# Patient Record
Sex: FEMALE | Race: BLACK OR AFRICAN AMERICAN | Employment: FULL TIME | URBAN - METROPOLITAN AREA
[De-identification: names, ages, dates, MRNs, and addresses within clinical notes are randomized per-mention and may not be internally consistent; named-entity substitution may affect disease eponyms.]

---

## 2019-01-25 PROBLEM — I10 UNCONTROLLED HYPERTENSION: Status: ACTIVE | Noted: 2019-01-25

## 2019-01-25 PROBLEM — R06.00 DYSPNEA: Status: ACTIVE | Noted: 2019-01-25

## 2019-01-25 PROBLEM — I16.0 HYPERTENSIVE URGENCY: Status: ACTIVE | Noted: 2019-01-25

## 2020-01-24 ENCOUNTER — OFFICE VISIT (OUTPATIENT)
Dept: FAMILY MEDICINE CLINIC | Age: 60
End: 2020-01-24

## 2020-01-24 VITALS
BODY MASS INDEX: 45.82 KG/M2 | WEIGHT: 275 LBS | DIASTOLIC BLOOD PRESSURE: 124 MMHG | HEIGHT: 65 IN | TEMPERATURE: 97.4 F | SYSTOLIC BLOOD PRESSURE: 168 MMHG | RESPIRATION RATE: 16 BRPM | HEART RATE: 86 BPM

## 2020-01-24 DIAGNOSIS — R91.8 MULTIPLE LUNG NODULES ON CT: ICD-10-CM

## 2020-01-24 DIAGNOSIS — R06.09 DYSPNEA ON EXERTION: ICD-10-CM

## 2020-01-24 DIAGNOSIS — I10 UNCONTROLLED HYPERTENSION: Primary | ICD-10-CM

## 2020-01-24 DIAGNOSIS — E66.01 OBESITY, MORBID (HCC): ICD-10-CM

## 2020-01-24 DIAGNOSIS — I20.9 ANGINAL PAIN (HCC): ICD-10-CM

## 2020-01-24 DIAGNOSIS — I25.119 CORONARY ARTERY DISEASE INVOLVING NATIVE HEART WITH ANGINA PECTORIS, UNSPECIFIED VESSEL OR LESION TYPE (HCC): ICD-10-CM

## 2020-01-24 PROBLEM — I25.10 CAD (CORONARY ARTERY DISEASE): Status: ACTIVE | Noted: 2020-01-24

## 2020-01-24 PROBLEM — I16.0 HYPERTENSIVE URGENCY: Status: RESOLVED | Noted: 2019-01-25 | Resolved: 2020-01-24

## 2020-01-24 RX ORDER — METOPROLOL SUCCINATE 100 MG/1
100 TABLET, EXTENDED RELEASE ORAL DAILY
Qty: 30 TAB | Refills: 0 | Status: SHIPPED | OUTPATIENT
Start: 2020-01-24 | End: 2020-02-21 | Stop reason: SDUPTHER

## 2020-01-24 RX ORDER — HYDROCHLOROTHIAZIDE 25 MG/1
25 TABLET ORAL DAILY
Qty: 30 TAB | Refills: 0 | Status: SHIPPED | OUTPATIENT
Start: 2020-01-24 | End: 2020-02-21 | Stop reason: SDUPTHER

## 2020-01-24 NOTE — PROGRESS NOTES
[address] [place] [address]               [phone]      Patel Cruz is a 61 y.o. female and presents with Establish Care and Hypertension (been reading high at home- No Symptoms )       Assessment/Plan:    Diagnoses and all orders for this visit:    1. Uncontrolled hypertension  -     EXERCISE CARDIAC STRESS TEST; Future  -     metoprolol succinate (TOPROL-XL) 100 mg tablet; Take 1 Tab by mouth daily. -     hydroCHLOROthiazide (HYDRODIURIL) 25 mg tablet; Take 1 Tab by mouth daily.  -     CBC W/O DIFF; Future  -     METABOLIC PANEL, COMPREHENSIVE; Future  Here today to establish care and address acute and chronic medical conditions  During today's office visit she expressed that she would get feelings of chest pressure and shortness of breath with exertion that resolves with rest, will order a cardiac stress test  Blood pressure elevated at today's visit at 168/124, she has not had any of her own medications she has been using her 's  Medication refills given based on last known drugs and dosages  Follow-up routine labs to evaluate hepatic and renal function    2. Obesity, morbid ([address]  -     LIPID PANEL; Future  We will check lipid level today    3. Coronary artery disease involving native heart with angina pectoris, unspecified vessel or lesion type [facility])  Past medical history of an MI, will obtain a cardiac stress test and follow-up of her reported symptoms today    4. Dyspnea on exertion  -     EXERCISE CARDIAC STRESS TEST; Future  -     CBC W/O DIFF; Future  Cardiac stress test for her reports of shortness of breath on exertion we will obtain a CBC to check for anemia    5. Multiple lung nodules on CT  -     CT CHEST WO CONT; Future  In January 2019 she had a CTA of the chest done which showed multiple enlarged lymph nodes and nodules  We will order repeat CT scan and follow-up of this    6.  Anginal pain (HCC)  -     EXERCISE CARDIAC STRESS TEST; Future        Follow-up and Dispositions    · Return in about 4 days (around 1/28/2020) for b/p check, nurse visit, OK to schedule her for 0830 . Subjective:    Hypertension:   Patient reports taking medications as instructed. no, has been taking her husbands medications, they are lisinopril 20mg   Medication side effects noted. yes , had a cough with the lisinopril  Headache upon wakening. no   Home BP monitoring in range of 238/148, 771/224'R systolic. With exertion gets short of breath and experiences pressure, no radiation  Resolves with rest  PMH MI in 2013    Has been taking lipozene    ROS:As stated in HPI, otherwise all others negative. ROS    The problem list was updated as a part of today's visit. Patient Active Problem List   Diagnosis Code    Dyspnea R06.00    Uncontrolled hypertension I10    Obesity, morbid ([facility] E66.01    CAD (coronary artery disease) I25.10       The PSH, FH were reviewed. SH:  Social History     Tobacco Use    Smoking status: Former Smoker     Packs/day: 1.00     Years: 40.00     Pack years: 40.00    Smokeless tobacco: Never Used   Substance Use Topics    Alcohol use: No    Drug use: No       Medications/Allergies:  Current Outpatient Medications on File Prior to Visit   Medication Sig Dispense Refill    aspirin 81 mg chewable tablet Take 1 Tab by mouth daily. 30 Tab 0     No current facility-administered medications on file prior to visit. No Known Allergies    Objective:  Visit Vitals  BP (!) 168/124   Pulse 86   Temp 97.4 °F (36.3 °C) (Oral)   Resp 16   Ht 5' 5\" (1.651 m)   Wt 275 lb (124.7 kg)   BMI 45.76 kg/m²    Body mass index is 45.76 kg/m². Physical assessment  Physical Exam  Vitals signs and nursing note reviewed. Eyes:      Conjunctiva/sclera: Conjunctivae normal.      Pupils: Pupils are equal, round, and reactive to light. Neck:      Musculoskeletal: Normal range of motion. Vascular: No JVD.    Cardiovascular:      Rate and Rhythm: Normal rate and regular rhythm. Heart sounds: Normal heart sounds. No murmur. No friction rub. No gallop. Pulmonary:      Effort: Pulmonary effort is normal.      Breath sounds: Normal breath sounds. Musculoskeletal: Normal range of motion. Skin:     General: Skin is warm and dry. Neurological:      Mental Status: She is alert and oriented to person, place, and time.    Psychiatric:         Mood and Affect: Affect normal.         Cognition and Memory: Memory normal.         Judgment: Judgment normal.           Labwork and Ancillary Studies:    CBC w/Diff  Lab Results   Component Value Date/Time    WBC 5.0 01/24/2020 02:35 PM    HGB 13.8 01/24/2020 02:35 PM    PLATELET 383 02/66/1530 02:35 PM         Basic Metabolic Profile  Lab Results   Component Value Date/Time    Sodium 144 01/24/2020 02:35 PM    Potassium 4.2 01/24/2020 02:35 PM    Chloride 99 01/24/2020 02:35 PM    CO2 33 (H) 01/24/2020 02:35 PM    Anion gap 12.0 01/24/2020 02:35 PM    Glucose 89 01/24/2020 02:35 PM    BUN 11 01/24/2020 02:35 PM    Creatinine 0.7 01/24/2020 02:35 PM    GFR est AA >60.0 01/27/2019 05:04 AM    GFR est non-AA >60 01/27/2019 05:04 AM    Calcium 9.2 01/24/2020 02:35 PM        Cholesterol  Lab Results   Component Value Date/Time    Cholesterol, total 222 (H) 01/24/2020 02:35 PM    HDL Cholesterol 46 01/24/2020 02:35 PM    LDL, calculated 161 (H) 01/24/2020 02:35 PM    Triglyceride 73 01/24/2020 02:35 PM    CHOL/HDL Ratio 4.8 (H) 01/26/2019 03:51 AM       Health Maintenance:   Health Maintenance   Topic Date Due    Hepatitis C Screening  1960    DTaP/Tdap/Td series (1 - Tdap) 04/23/1971    PAP AKA CERVICAL CYTOLOGY  04/23/1981    Shingrix Vaccine Age 50> (1 of 2) 04/23/2010    BREAST CANCER SCRN MAMMOGRAM  04/23/2010    FOBT Q 1 YEAR AGE 50-75  04/23/2010    Influenza Age 5 to Adult  08/01/2019    Pneumococcal 0-64 years  Aged Out       I have discussed the diagnosis with the patient and the intended plan as seen in the above orders. The patient has received an After-Visit Summary and questions were answered concerning future plans. An After Visit Summary was printed and given to the patient. All diagnosis have been discussed with the patient and all of the patient's questions have been answered. Follow-up and Dispositions    · Return in about 4 days (around 1/28/2020) for b/p check, nurse visit, OK to schedule her for 0830 . Joana Encarnacion, AGNP-BC  [address] [id number] [facility] [address]  [id number]

## 2020-01-24 NOTE — LETTER
NOTIFICATION RETURN TO WORK / SCHOOL 
 
1/24/2020 2:26 PM 
 
Ms. Quoc Vogt 
[address] [place] [address] To Whom It May Concern: 
 
Alpha Coupe is currently under the care of Radha Awad. She will return to work/school on: 1/28/20 If there are questions or concerns please have the patient contact our office. Sincerely, Uvaldo Mayfield NP

## 2020-01-24 NOTE — PROGRESS NOTES
Chief Complaint   Patient presents with   [address] Hypertension     been reading high at home- No Symptoms        1. Have you been to the ER, urgent care clinic since your last visit? Hospitalized since your last visit? NO    2. Have you seen or consulted any other health care providers outside of the [address] [place] [address] since your last visit? Include any pap smears or colon screening.  NO

## 2020-01-25 LAB
A-G RATIO,AGRAT: 1.4 RATIO (ref 1.1–2.6)
ALBUMIN SERPL-MCNC: 4.1 G/DL (ref 3.5–5)
ALP SERPL-CCNC: 52 U/L (ref 25–115)
ALT SERPL-CCNC: 15 U/L (ref 5–40)
ANION GAP SERPL CALC-SCNC: 12 MMOL/L
AST SERPL W P-5'-P-CCNC: 10 U/L (ref 10–37)
BILIRUB SERPL-MCNC: 0.4 MG/DL (ref 0.2–1.2)
BUN SERPL-MCNC: 11 MG/DL (ref 6–22)
CALCIUM SERPL-MCNC: 9.2 MG/DL (ref 8.4–10.5)
CHLORIDE SERPL-SCNC: 99 MMOL/L (ref 98–110)
CHOLEST SERPL-MCNC: 222 MG/DL (ref 110–200)
CO2 SERPL-SCNC: 33 MMOL/L (ref 20–32)
CREAT SERPL-MCNC: 0.7 MG/DL (ref 0.5–1.2)
ERYTHROCYTE [DISTWIDTH] IN BLOOD BY AUTOMATED COUNT: 14.6 % (ref 10–15.5)
GFRAA, 66117: >60
GFRNA, 66118: >60
GLOBULIN,GLOB: 3 G/DL (ref 2–4)
GLUCOSE SERPL-MCNC: 89 MG/DL (ref 70–99)
HCT VFR BLD AUTO: 47.4 % (ref 35.1–48)
HDLC SERPL-MCNC: 4.8 MG/DL (ref 0–5)
HDLC SERPL-MCNC: 46 MG/DL
HGB BLD-MCNC: 13.8 G/DL (ref 11.7–16)
LDL/HDL RATIO,LDHD: 3.5
LDLC SERPL CALC-MCNC: 161 MG/DL (ref 50–99)
MCH RBC QN AUTO: 28 PG (ref 26–34)
MCHC RBC AUTO-ENTMCNC: 29 G/DL (ref 31–36)
MCV RBC AUTO: 96 FL (ref 81–99)
NON-HDL CHOLESTEROL, 011976: 176 MG/DL
PLATELET # BLD AUTO: 284 K/UL (ref 140–440)
PMV BLD AUTO: 11.7 FL (ref 9–13)
POTASSIUM SERPL-SCNC: 4.2 MMOL/L (ref 3.5–5.5)
PROT SERPL-MCNC: 7.1 G/DL (ref 6.4–8.3)
RBC # BLD AUTO: 4.96 M/UL (ref 3.8–5.2)
SODIUM SERPL-SCNC: 144 MMOL/L (ref 133–145)
TRIGL SERPL-MCNC: 73 MG/DL (ref 40–149)
VLDLC SERPL CALC-MCNC: 15 MG/DL (ref 8–30)
WBC # BLD AUTO: 5 K/UL (ref 4–11)

## 2020-01-28 ENCOUNTER — CLINICAL SUPPORT (OUTPATIENT)
Dept: FAMILY MEDICINE CLINIC | Age: 60
End: 2020-01-28

## 2020-01-28 VITALS
HEIGHT: 65 IN | TEMPERATURE: 97.9 F | BODY MASS INDEX: 44.82 KG/M2 | HEART RATE: 68 BPM | SYSTOLIC BLOOD PRESSURE: 168 MMHG | DIASTOLIC BLOOD PRESSURE: 108 MMHG | RESPIRATION RATE: 16 BRPM | WEIGHT: 269 LBS

## 2020-01-28 DIAGNOSIS — I10 UNCONTROLLED HYPERTENSION: Primary | ICD-10-CM

## 2020-01-28 RX ORDER — AMLODIPINE BESYLATE 10 MG/1
10 TABLET ORAL DAILY
Qty: 30 TAB | Refills: 2 | Status: SHIPPED | OUTPATIENT
Start: 2020-01-28 | End: 2020-03-24 | Stop reason: SDUPTHER

## 2020-01-28 NOTE — PROGRESS NOTES
Chief Complaint   Patient presents with    Blood Pressure Check     Have taken medications this morning       Per NP She added Norvasc 10mg daily and she wants pt to come back in a week.  For recheck of BP

## 2020-01-28 NOTE — LETTER
NOTIFICATION RETURN TO WORK / SCHOOL 
 
1/28/2020 8:46 AM 
 
Ms. Patti Bence 
[address] [place] [address] To Whom It May Concern: 
 
Patti Bence is currently under the care of Radha Awad. She will return to work/school on: 1/29/20 If there are questions or concerns please have the patient contact our office. Sincerely, Rand Martínez NP

## 2020-02-04 NOTE — PROGRESS NOTES
Please let her know her total and LDL cholesterol are elevated, no medications are indicated at this time however, she needs to work on weight loss and eating a low-fat heart healthy diet along with increasing exercise

## 2020-02-11 NOTE — PROGRESS NOTES
Pt had a nurse visit for Blood pressure last week- And was informed of this information from JANICE saab's note.  Thank You

## 2020-02-13 ENCOUNTER — HOSPITAL ENCOUNTER (OUTPATIENT)
Dept: CT IMAGING | Age: 60
Discharge: HOME | End: 2020-02-13
Attending: NURSE PRACTITIONER
Payer: COMMERCIAL

## 2020-02-13 DIAGNOSIS — R91.8 MULTIPLE LUNG NODULES ON CT: ICD-10-CM

## 2020-02-13 PROCEDURE — 71250 CT THORAX DX C-: CPT

## 2020-02-17 ENCOUNTER — CLINICAL SUPPORT (OUTPATIENT)
Dept: FAMILY MEDICINE CLINIC | Age: 60
End: 2020-02-17

## 2020-02-17 VITALS — DIASTOLIC BLOOD PRESSURE: 93 MMHG | SYSTOLIC BLOOD PRESSURE: 141 MMHG

## 2020-02-17 DIAGNOSIS — I10 UNCONTROLLED HYPERTENSION: Primary | ICD-10-CM

## 2020-02-17 RX ORDER — OLMESARTAN MEDOXOMIL 20 MG/1
20 TABLET ORAL DAILY
Qty: 30 TAB | Refills: 2 | Status: SHIPPED | OUTPATIENT
Start: 2020-02-17 | End: 2020-03-24 | Stop reason: SDUPTHER

## 2020-02-17 NOTE — PROGRESS NOTES
Chief Complaint   Patient presents with    Blood Pressure Check     Patient reports taking BP medications after 9am. Patient took all medications this morning. Patient did not have coffee. Patient spoke with provider regarding new instructions.

## 2020-02-21 DIAGNOSIS — I10 UNCONTROLLED HYPERTENSION: ICD-10-CM

## 2020-02-21 NOTE — TELEPHONE ENCOUNTER
Requested Prescriptions     Pending Prescriptions Disp Refills    metoprolol succinate (TOPROL-XL) 100 mg tablet 30 Tab 0     Sig: Take 1 Tab by mouth daily.  hydroCHLOROthiazide (HYDRODIURIL) 25 mg tablet 30 Tab 0     Sig: Take 1 Tab by mouth daily.

## 2020-02-23 RX ORDER — HYDROCHLOROTHIAZIDE 25 MG/1
25 TABLET ORAL DAILY
Qty: 30 TAB | Refills: 0 | Status: SHIPPED | OUTPATIENT
Start: 2020-02-23 | End: 2020-03-24 | Stop reason: SDUPTHER

## 2020-02-23 RX ORDER — METOPROLOL SUCCINATE 100 MG/1
100 TABLET, EXTENDED RELEASE ORAL DAILY
Qty: 30 TAB | Refills: 0 | Status: SHIPPED | OUTPATIENT
Start: 2020-02-23 | End: 2020-03-24 | Stop reason: SDUPTHER

## 2020-02-23 NOTE — PATIENT INSTRUCTIONS
Start olmesartan 20 mg 1 tablet by mouth once a day, follow-up on previously scheduled visit for March 16

## 2020-03-25 DIAGNOSIS — I10 UNCONTROLLED HYPERTENSION: ICD-10-CM

## 2020-03-25 RX ORDER — AMLODIPINE BESYLATE 10 MG/1
10 TABLET ORAL DAILY
Qty: 90 TAB | Refills: 1 | Status: SHIPPED | OUTPATIENT
Start: 2020-03-25 | End: 2020-04-15

## 2020-03-25 RX ORDER — OLMESARTAN MEDOXOMIL 20 MG/1
20 TABLET ORAL DAILY
Qty: 90 TAB | Refills: 1 | Status: SHIPPED | OUTPATIENT
Start: 2020-03-25 | End: 2020-04-15

## 2020-03-25 RX ORDER — METOPROLOL SUCCINATE 100 MG/1
100 TABLET, EXTENDED RELEASE ORAL DAILY
Qty: 90 TAB | Refills: 1 | Status: SHIPPED | OUTPATIENT
Start: 2020-03-25 | End: 2020-04-15

## 2020-03-25 RX ORDER — HYDROCHLOROTHIAZIDE 25 MG/1
25 TABLET ORAL DAILY
Qty: 90 TAB | Refills: 1 | Status: SHIPPED | OUTPATIENT
Start: 2020-03-25 | End: 2020-04-15

## 2020-03-25 NOTE — TELEPHONE ENCOUNTER
Patient called stating that the pharmacy called her and told her that they don't have any over her medications that Janey Prather sent over to the pharmacy today 3/25/2020.  Patient requested a call back

## 2020-04-03 ENCOUNTER — TELEPHONE (OUTPATIENT)
Dept: FAMILY MEDICINE CLINIC | Age: 60
End: 2020-04-03

## 2020-04-03 NOTE — TELEPHONE ENCOUNTER
Patient thinks she has the COVID VIrus, experiencing cough, fever comes and goes and weakness. She does not want to go get tested. Ms. Shepard Eduardo would like to take Tylenol, Penicillin and Cough Syrup. Wants to know if this is know sufficient.    Call her back at [phone]

## 2020-04-03 NOTE — TELEPHONE ENCOUNTER
Returned Mrs. 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

## 2020-04-03 NOTE — TELEPHONE ENCOUNTER
Called pt back- she states she has not taken her Temp but states she has chills and clenches up. She has not traveled or been around anyone that has, that she knows of! 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 I told her I would discuss further with JANICE Ziegler and get back to her.  Thanks

## 2020-04-04 PROBLEM — J96.02 ACUTE RESPIRATORY FAILURE WITH HYPERCAPNIA (HCC): Status: ACTIVE | Noted: 2020-04-04

## 2020-04-04 PROBLEM — J18.9 RLL PNEUMONIA: Status: ACTIVE | Noted: 2020-04-04

## 2020-04-04 PROBLEM — J96.90 RESPIRATORY FAILURE (HCC): Status: ACTIVE | Noted: 2020-04-04

## 2020-04-04 PROBLEM — Z20.822 SUSPECTED COVID-19 VIRUS INFECTION: Status: ACTIVE | Noted: 2020-04-04

## 2020-04-04 PROBLEM — I21.4 NSTEMI (NON-ST ELEVATED MYOCARDIAL INFARCTION) (HCC): Status: ACTIVE | Noted: 2020-04-04

## 2020-04-04 PROBLEM — J18.9 PNEUMONIA: Status: ACTIVE | Noted: 2020-04-04

## 2020-04-15 PROBLEM — J18.9 PNEUMONIA: Status: RESOLVED | Noted: 2020-04-04 | Resolved: 2020-04-15

## 2020-04-15 PROBLEM — I21.4 NSTEMI (NON-ST ELEVATED MYOCARDIAL INFARCTION) (HCC): Status: ACTIVE | Noted: 2020-04-15

## 2020-04-15 PROBLEM — I46.9 PEA (PULSELESS ELECTRICAL ACTIVITY) (HCC): Status: ACTIVE | Noted: 2020-04-15

## 2020-04-15 PROBLEM — J96.02 ACUTE RESPIRATORY FAILURE WITH HYPOXIA AND HYPERCAPNIA (HCC): Status: ACTIVE | Noted: 2020-04-15

## 2020-04-15 PROBLEM — Z20.822 SUSPECTED COVID-19 VIRUS INFECTION: Status: RESOLVED | Noted: 2020-04-04 | Resolved: 2020-04-15

## 2020-04-15 PROBLEM — I46.9 PEA (PULSELESS ELECTRICAL ACTIVITY) (HCC): Status: RESOLVED | Noted: 2020-04-15 | Resolved: 2020-04-15

## 2020-04-15 PROBLEM — N17.9 AKI (ACUTE KIDNEY INJURY) (HCC): Status: ACTIVE | Noted: 2020-04-15

## 2020-04-15 PROBLEM — I50.30 DIASTOLIC CHF (HCC): Status: ACTIVE | Noted: 2020-04-15

## 2020-04-15 PROBLEM — J96.01 ACUTE RESPIRATORY FAILURE WITH HYPOXIA AND HYPERCAPNIA (HCC): Status: ACTIVE | Noted: 2020-04-15

## 2020-04-21 ENCOUNTER — VIRTUAL VISIT (OUTPATIENT)
Dept: FAMILY MEDICINE CLINIC | Age: 60
End: 2020-04-21

## 2020-04-21 NOTE — PROGRESS NOTES
A user error has taken place: encounter opened in error, closed for administrative reasons  Patient never answered her phone for visit.

## 2020-04-21 NOTE — PROGRESS NOTES
A user error has taken place: encounter opened in error, closed for administrative reasons. I left several messages on her cell phone regarding her Virtual appointment today but did not returned any of my calls.

## 2020-04-29 ENCOUNTER — VIRTUAL VISIT (OUTPATIENT)
Dept: FAMILY MEDICINE CLINIC | Age: 60
End: 2020-04-29

## 2020-04-29 DIAGNOSIS — I21.4 NSTEMI (NON-ST ELEVATED MYOCARDIAL INFARCTION) (HCC): ICD-10-CM

## 2020-04-29 DIAGNOSIS — J96.21 ACUTE ON CHRONIC RESPIRATORY FAILURE WITH HYPOXIA (HCC): Primary | ICD-10-CM

## 2020-04-29 DIAGNOSIS — I10 UNCONTROLLED HYPERTENSION: ICD-10-CM

## 2020-04-29 RX ORDER — HYDROCHLOROTHIAZIDE 25 MG/1
25 TABLET ORAL DAILY
COMMUNITY
End: 2020-04-29

## 2020-04-29 RX ORDER — CLOPIDOGREL BISULFATE 75 MG/1
75 TABLET ORAL DAILY
Qty: 30 TAB | Refills: 2 | Status: SHIPPED | OUTPATIENT
Start: 2020-04-29 | End: 2020-08-05 | Stop reason: SDUPTHER

## 2020-04-29 RX ORDER — ATORVASTATIN CALCIUM 80 MG/1
80 TABLET, FILM COATED ORAL
Qty: 30 TAB | Refills: 2 | Status: SHIPPED | OUTPATIENT
Start: 2020-04-29 | End: 2020-08-24 | Stop reason: SDUPTHER

## 2020-04-29 RX ORDER — METOPROLOL TARTRATE 50 MG/1
50 TABLET ORAL 2 TIMES DAILY
Qty: 60 TAB | Refills: 2 | Status: SHIPPED | OUTPATIENT
Start: 2020-04-29 | End: 2020-08-12 | Stop reason: SDUPTHER

## 2020-04-29 RX ORDER — OLMESARTAN MEDOXOMIL 20 MG/1
20 TABLET ORAL DAILY
COMMUNITY
End: 2020-04-29

## 2020-04-29 NOTE — PROGRESS NOTES
1. Have you been to the ER, urgent care clinic since your last visit? Hospitalized since your last visit? Yes, went to [address] [place] [address] on 4/4/2020 for Pneumonia. 2. Have you seen or consulted any other health care providers outside of the [address] [place] [address] since your last visit? Include any pap smears or colon screening. No    Chief Complaint   Patient presents with   [facility] Follow Up     went to [address] [place] [address] on 4/4/2020 for Pneumonia adn SOB. She didnt follow her discharged summary instructions regarding her current & new medications. No one ecpalined her d/c summary. Patient did not stop taking her olmesartan, HCTZ, metoprolol xl 100mg and norvasc and did not start taking the Plavix, metoprolol succ 50mg and lipitor 80mg since no once explained her d/c summary to her when she left the hospital on 4/15/2020.

## 2020-04-29 NOTE — PROGRESS NOTES
[address] [place] [address]               [phone]      Rayna Martin is a 61 y.o. female who was seen by synchronous (real-time) audio-video technology on 4/29/2020. Consent: Rayna Martin, who was seen by synchronous (real-time) audio-video technology, and/or her healthcare decision maker, is aware that this patient-initiated, Telehealth encounter on 4/29/2020 is a billable service, with coverage as determined by her insurance carrier. She is aware that she may receive a bill and has provided verbal consent to proceed: Yes. Assessment & Plan:     Diagnoses and all orders for this visit:    1. Acute on chronic respiratory failure with hypoxia ([address]  Recent 11-day hospitalization which included intubation  Now back at home, on this video visit she appeared comfortable and in no respiratory distress    2. NSTEMI (non-ST elevated myocardial infarction) ([facility])  -     atorvastatin (LIPITOR) 80 mg tablet; Take 1 Tab by mouth nightly. Indications: excessive fat in the blood, treatment to prevent a heart attack  -     clopidogreL (PLAVIX) 75 mg tab; Take 1 Tab by mouth daily. Indications: treatment to prevent a heart attack  -     metoprolol tartrate (LOPRESSOR) 50 mg tablet; Take 1 Tab by mouth two (2) times a day. Indications: myocardial reinfarction prevention  During her hospitalization she suffered a PEA arrest which resulted in a NSTEMI  Many of her medications were changed, I reviewed all this with her as she had stated she did not know her medications had been changed and that no one explained it to her  Went through her medications one by 1 telling her the reason why she was taking them and why the changes were done  She verbalized understanding  I also reviewed with her each of her diagnoses from the hospital, explained to her why she had a heart attack and how it was related to her respiratory failure  Medication refills ordered    3.  Uncontrolled hypertension  -     metoprolol tartrate (LOPRESSOR) 50 mg tablet; Take 1 Tab by mouth two (2) times a day. Indications: myocardial reinfarction prevention  Medication refill provided    Greater than 50% of the 40 minutes was spent in counseling and coordination of care. This note was dictated using Dragon dictation system. Every effort was made to ensure accuracy, although occasional spelling errors and word substitutions are expected due to dictation system limitations. Thank you for your understanding and patience. Follow-up and Dispositions    · Return in about 2 months (around 6/29/2020) for hypertension, hyperthyroid, copd, office visit, 30 minutes. 712  Subjective:   Danny Jiang is a 61 y.o. female who was seen for   Hospital Follow Up (went to [facility] on 4/4/2020 for Pneumonia adn SOB. She didnt follow her discharged summary instructions regarding her current & new medications. No one ecpalined her d/c summary. )    Hospitalization summary; Admitted for acute hypoxic and hypercapnic respiratory failure where she was subsequently intubated, she was ruled out for COVID-19, cause of her respiratory failure was determined to be pneumonia which was treated and she was ultimately extubated and discharged home on oxygen at 2 L/min via nasal cannula. During this hospitalization she did have a PEA arrest which they felt was related to the hypoxia resulting in a type II NSTEMI. Her troponins peaked at 6.13, she had an echocardiogram done which showed an EF of 58% and was started on aspirin and Plavix which cardiology recommended she continue for 6 to 12 months. She was also started on a high intensity statin. She is to follow-up with us brewer in 4 to 6 weeks.     Test results:  Echocardiogram showed an EF of 58%  Chest x-ray showed a right basilar pneumonia with a recommended follow-up chest x-ray in 30 days    Date admitted: 4/4/2020    Date discharged: 4/15/2020    Date of face to face: 4/29/2020    Medication reconciliation performed: Yes    Medications added/changed/discontinued: Start: Lipitor 80 mg, Plavix 75 mg, Lopressor 50 mg, Brio daily. Stop: Hydrochlorothiazide 25 mg, Toprol  mg, amlodipine 10 mg, and olmesartan 20 mg    Review discharge instructions: Yes    Need for follow up on in hospital testing: No    Need for follow up with specialist: Yes  Name of specialist: Cardiology, see eval to arrange follow-up in 4 to 6 weeks, she was also discharged with home health services    Does patient have help at home: Yes  Name: Brother, daughter    Barriers to obtaining medications: No    Patient/family educated on cause of hospitalization: Yes    Patient/family able to repeat back cause of hospitalization, disease process, medication changes, and when to seek help: Yes    Patient Past Records were reviewed:  yes        Prior to Admission medications    Medication Sig Start Date End Date Taking? Authorizing Provider   atorvastatin (LIPITOR) 80 mg tablet Take 1 Tab by mouth nightly for 30 days. Indications: excessive fat in the blood, treatment to prevent a heart attack 4/15/20 5/15/20 Yes Evelyn Morris MD   clopidogreL (PLAVIX) 75 mg tab 1 Tab by PEG Tube route daily for 30 days. Indications: treatment to prevent a heart attack 4/16/20 5/16/20 Yes Evelyn Morris MD   metoprolol tartrate (LOPRESSOR) 50 mg tablet Take 1 Tab by mouth two (2) times a day for 30 days. Indications: myocardial reinfarction prevention 4/15/20 5/15/20 Yes Evelyn Morris MD   fluticasone furoate-vilanteroL (BREO ELLIPTA) 100-25 mcg/dose inhaler Take 1 Puff by inhalation daily for 30 days. Indications: bronchospasm prevention with COPD 4/15/20 5/15/20 Yes Evelyn Morris MD   aspirin 81 mg chewable tablet Take 1 Tab by mouth daily. 3/25/20  Yes Caterina Mcclure NP   hydroCHLOROthiazide (HYDRODIURIL) 25 mg tablet Take 25 mg by mouth daily.   4/29/20  Provider, Historical   olmesartan (BENICAR) 20 mg tablet Take 20 mg by mouth daily. 4/29/20  Provider, Historical     No Known Allergies    Patient Active Problem List   Diagnosis Code    Dyspnea R06.00    Uncontrolled hypertension I10    Obesity, morbid ([facility] E66.01    CAD (coronary artery disease) I25.10    Respiratory failure ([address] J96.90    Acute respiratory failure with hypoxia and hypercapnia (HCC) J96.01, J96.02    NSTEMI (non-ST elevated myocardial infarction) ([facility] I21.4    SHALA (acute kidney injury) ([facility] X86.9    Diastolic CHF ([facility] A64.39     Patient Active Problem List    Diagnosis Date Noted    Acute respiratory failure with hypoxia and hypercapnia ([address] 04/15/2020    NSTEMI (non-ST elevated myocardial infarction) ([facility] 04/15/2020    SHALA (acute kidney injury) ([facility] 85/31/7403    Diastolic CHF ([address] 23/29/4609    Respiratory failure ([address] 04/04/2020    Obesity, morbid ([facility] 01/24/2020    CAD (coronary artery disease) 01/24/2020    Dyspnea 01/25/2019    Uncontrolled hypertension 01/25/2019     Current Outpatient Medications   Medication Sig Dispense Refill    atorvastatin (LIPITOR) 80 mg tablet Take 1 Tab by mouth nightly. Indications: excessive fat in the blood, treatment to prevent a heart attack 30 Tab 2    clopidogreL (PLAVIX) 75 mg tab Take 1 Tab by mouth daily. Indications: treatment to prevent a heart attack 30 Tab 2    metoprolol tartrate (LOPRESSOR) 50 mg tablet Take 1 Tab by mouth two (2) times a day. Indications: myocardial reinfarction prevention 60 Tab 2    fluticasone furoate-vilanteroL (BREO ELLIPTA) 100-25 mcg/dose inhaler Take 1 Puff by inhalation daily for 30 days. Indications: bronchospasm prevention with COPD 1 Inhaler 2    aspirin 81 mg chewable tablet Take 1 Tab by mouth daily.  80 Tab 1     No Known Allergies  Past Medical History:   Diagnosis Date    CAD (coronary artery disease)     mi 2013     Past Surgical History:   Procedure Laterality Date    HX CORONARY STENT PLACEMENT       Family History   Problem Relation Age of Onset    Hypertension Mother     Stroke Mother     No Known Problems Father      Social History     Tobacco Use    Smoking status: Former Smoker     Packs/day: 1.00     Years: 40.00     Pack years: 40.00    Smokeless tobacco: Never Used   Substance Use Topics    Alcohol use: No       ROS  As stated in HPI, otherwise all others negative. Objective: There were no vitals taken for this visit. General: alert, cooperative, no distress, sitting upright, talking in complete sentences   Mental  status: normal mood, behavior, speech, dress, motor activity, and thought processes, able to follow commands   HENT: NCAT   Neck: no visualized mass   Resp: no respiratory distress   Neuro: no gross deficits   Skin: no discoloration or lesions of concern on visible areas   Psychiatric: normal affect, consistent with stated mood, no evidence of hallucinations     Additional exam findings: We discussed the expected course, resolution and complications of the diagnosis(es) in detail. Medication risks, benefits, costs, interactions, and alternatives were discussed as indicated. I advised her to contact the office if her condition worsens, changes or fails to improve as anticipated. She expressed understanding with the diagnosis(es) and plan. Elvie Lefort is a 61 y.o. female who was evaluated by a video visit encounter for concerns as above. Patient identification was verified prior to start of the visit. A caregiver was present when appropriate. Due to this being a TeleHealth encounter ([facility] health emergency), evaluation of the following organ systems was limited: Vitals/Constitutional/EENT/Resp/CV/GI//MS/Neuro/Skin/Heme-Lymph-Imm.   Pursuant to the emergency declaration under the [facility], [facility], [facility] waiver authority and the [employer] and [employer] Act, this Virtual  Visit was conducted, with patient's (and/or legal guardian's) consent, to reduce the patient's risk of exposure to COVID-19 and provide necessary medical care. Services were provided through a video synchronous discussion virtually to substitute for in-person clinic visit. Patient and provider were located at their individual homes. An After Visit Summary was printed and given to the patient. All diagnosis have been discussed with the patient and all of the patient's questions have been answered. Follow-up and Dispositions    · Return in about 2 months (around 6/29/2020) for hypertension, hyperthyroid, copd, office visit, 30 minutes. Jaime Monteiro [facility] [place] [address] Rd.   Lin Downs

## 2020-06-05 ENCOUNTER — CLINICAL SUPPORT (OUTPATIENT)
Dept: FAMILY MEDICINE CLINIC | Age: 60
End: 2020-06-05

## 2020-06-05 VITALS
RESPIRATION RATE: 16 BRPM | BODY MASS INDEX: 44.72 KG/M2 | OXYGEN SATURATION: 96 % | DIASTOLIC BLOOD PRESSURE: 118 MMHG | HEIGHT: 65 IN | WEIGHT: 268.4 LBS | TEMPERATURE: 98.4 F | SYSTOLIC BLOOD PRESSURE: 194 MMHG | HEART RATE: 65 BPM

## 2020-06-05 DIAGNOSIS — I10 UNCONTROLLED HYPERTENSION: Primary | ICD-10-CM

## 2020-06-05 RX ORDER — OLMESARTAN MEDOXOMIL 40 MG/1
40 TABLET ORAL DAILY
Qty: 90 TAB | Refills: 1 | Status: SHIPPED | OUTPATIENT
Start: 2020-06-05 | End: 2020-06-22 | Stop reason: SDUPTHER

## 2020-06-05 NOTE — PROGRESS NOTES
Spoke with mrs low, instructed her to restart her olmesaran at the prior does, she still has some, and let me know her b/p in a week. She has a home machine    All diagnosis have been discussed with the patient and all of the patient's questions have been answered.

## 2020-06-05 NOTE — PROGRESS NOTES
Chief Complaint   Patient presents with    Blood Pressure Check    Breathing Problem     has COPD     Per Krishan Augustin NP she will call patient to make some medication changes on her BP medication. Phoe [phone].

## 2020-06-08 NOTE — PROGRESS NOTES
Done, I called patient and she stated that Luciana Goodpasture, NP called her last Friday and told her to restart the Olmesartan 40mg once a day and come in next week for BP check.  Scheduled 6/16/2020 at 10:15am

## 2020-06-22 DIAGNOSIS — I10 UNCONTROLLED HYPERTENSION: ICD-10-CM

## 2020-06-22 RX ORDER — OLMESARTAN MEDOXOMIL 40 MG/1
40 TABLET ORAL DAILY
Qty: 90 TAB | Refills: 1 | Status: SHIPPED | OUTPATIENT
Start: 2020-06-22 | End: 2020-06-24 | Stop reason: CLARIF

## 2020-06-23 ENCOUNTER — TELEPHONE (OUTPATIENT)
Dept: FAMILY MEDICINE CLINIC | Age: 60
End: 2020-06-23

## 2020-06-23 DIAGNOSIS — I10 UNCONTROLLED HYPERTENSION: Primary | ICD-10-CM

## 2020-06-23 NOTE — TELEPHONE ENCOUNTER
Pharmacy called is is requesting a alternate to  Benicar as it  is so expensive for the patient.   Call Jose Britt back at [phone]

## 2020-06-24 RX ORDER — LOSARTAN POTASSIUM 100 MG/1
100 TABLET ORAL DAILY
Qty: 90 TAB | Refills: 0 | Status: SHIPPED | OUTPATIENT
Start: 2020-06-24 | End: 2020-09-21 | Stop reason: SDUPTHER

## 2020-06-24 NOTE — TELEPHONE ENCOUNTER
Called mrs low at home  184/102 at home yesterday  Stop olmesartan  Start Losartan 100mg once a day  Goal b/p less than 140/90  Call if she cannot reach her goal with current medications  She verbalized understanding  As she does not currently have insurance I suggested she check her medication website for prescription assistance, she verbalized understanding and stated she was able to do that    All diagnosis have been discussed with the patient and all of the patient's questions have been answered.

## 2020-06-24 NOTE — TELEPHONE ENCOUNTER
Pt called back to f/u  She says that the pharmacy told her that she could get  Losartan 90 day supply for $24

## 2020-07-07 ENCOUNTER — OFFICE VISIT (OUTPATIENT)
Dept: FAMILY MEDICINE CLINIC | Age: 60
End: 2020-07-07

## 2020-07-07 VITALS
DIASTOLIC BLOOD PRESSURE: 100 MMHG | TEMPERATURE: 97.4 F | HEART RATE: 61 BPM | WEIGHT: 264 LBS | OXYGEN SATURATION: 95 % | HEIGHT: 65 IN | BODY MASS INDEX: 43.99 KG/M2 | RESPIRATION RATE: 14 BRPM | SYSTOLIC BLOOD PRESSURE: 181 MMHG

## 2020-07-07 DIAGNOSIS — I25.2 HISTORY OF NON-ST ELEVATION MYOCARDIAL INFARCTION (NSTEMI): ICD-10-CM

## 2020-07-07 DIAGNOSIS — Z12.31 SCREENING MAMMOGRAM, ENCOUNTER FOR: ICD-10-CM

## 2020-07-07 DIAGNOSIS — Z13.820 SCREENING FOR OSTEOPOROSIS: ICD-10-CM

## 2020-07-07 DIAGNOSIS — I10 UNCONTROLLED HYPERTENSION: Primary | ICD-10-CM

## 2020-07-07 DIAGNOSIS — E78.2 MIXED HYPERLIPIDEMIA: ICD-10-CM

## 2020-07-07 PROBLEM — I21.4 NSTEMI (NON-ST ELEVATED MYOCARDIAL INFARCTION) (HCC): Status: RESOLVED | Noted: 2020-04-15 | Resolved: 2020-07-07

## 2020-07-07 PROBLEM — J96.02 ACUTE RESPIRATORY FAILURE WITH HYPOXIA AND HYPERCAPNIA (HCC): Status: RESOLVED | Noted: 2020-04-15 | Resolved: 2020-07-07

## 2020-07-07 PROBLEM — J96.01 ACUTE RESPIRATORY FAILURE WITH HYPOXIA AND HYPERCAPNIA (HCC): Status: RESOLVED | Noted: 2020-04-15 | Resolved: 2020-07-07

## 2020-07-07 PROBLEM — J96.90 RESPIRATORY FAILURE (HCC): Status: RESOLVED | Noted: 2020-04-04 | Resolved: 2020-07-07

## 2020-07-07 PROBLEM — R06.00 DYSPNEA: Status: RESOLVED | Noted: 2019-01-25 | Resolved: 2020-07-07

## 2020-07-07 RX ORDER — HYDROCHLOROTHIAZIDE 25 MG/1
25 TABLET ORAL DAILY
Qty: 90 TAB | Refills: 1 | Status: SHIPPED | OUTPATIENT
Start: 2020-07-07 | End: 2020-12-29 | Stop reason: SDUPTHER

## 2020-07-07 NOTE — PROGRESS NOTES
Chief Complaint   Patient presents with    Hypertension    Thyroid Problem     1. Have you been to the ER, urgent care clinic since your last visit? Hospitalized since your last visit NO    2. Have you seen or consulted any other health care providers outside of the [address] [place] [address] since your last visit? Include any pap smears or colon screening.  NO

## 2020-07-07 NOTE — PATIENT INSTRUCTIONS
DASH Diet: Care Instructions Your Care Instructions The DASH diet is an eating plan that can help lower your blood pressure. DASH stands for Dietary Approaches to Stop Hypertension. Hypertension is high blood pressure. The DASH diet focuses on eating foods that are high in calcium, potassium, and magnesium. These nutrients can lower blood pressure. The foods that are highest in these nutrients are fruits, vegetables, low-fat dairy products, nuts, seeds, and legumes. But taking calcium, potassium, and magnesium supplements instead of eating foods that are high in those nutrients does not have the same effect. The DASH diet also includes whole grains, fish, and poultry. The DASH diet is one of several lifestyle changes your doctor may recommend to lower your high blood pressure. Your doctor may also want you to decrease the amount of sodium in your diet. Lowering sodium while following the DASH diet can lower blood pressure even further than just the DASH diet alone. Follow-up care is a key part of your treatment and safety. Be sure to make and go to all appointments, and call your doctor if you are having problems. It's also a good idea to know your test results and keep a list of the medicines you take. How can you care for yourself at home? Following the DASH diet · Eat 4 to 5 servings of fruit each day. A serving is 1 medium-sized piece of fruit, ½ cup chopped or canned fruit, 1/4 cup dried fruit, or 4 ounces (½ cup) of fruit juice. Choose fruit more often than fruit juice. · Eat 4 to 5 servings of vegetables each day. A serving is 1 cup of lettuce or raw leafy vegetables, ½ cup of chopped or cooked vegetables, or 4 ounces (½ cup) of vegetable juice. Choose vegetables more often than vegetable juice. · Get 2 to 3 servings of low-fat and fat-free dairy each day. A serving is 8 ounces of milk, 1 cup of yogurt, or 1 ½ ounces of cheese. · Eat 6 to 8 servings of grains each day. A serving is 1 slice of bread, 1 ounce of dry cereal, or ½ cup of cooked rice, pasta, or cooked cereal. Try to choose whole-grain products as much as possible. · Limit lean meat, poultry, and fish to 2 servings each day. A serving is 3 ounces, about the size of a deck of cards. · Eat 4 to 5 servings of nuts, seeds, and legumes (cooked dried beans, lentils, and split peas) each week. A serving is 1/3 cup of nuts, 2 tablespoons of seeds, or ½ cup of cooked beans or peas. · Limit fats and oils to 2 to 3 servings each day. A serving is 1 teaspoon of vegetable oil or 2 tablespoons of salad dressing. · Limit sweets and added sugars to 5 servings or less a week. A serving is 1 tablespoon jelly or jam, ½ cup sorbet, or 1 cup of lemonade. · Eat less than 2,300 milligrams (mg) of sodium a day. If you limit your sodium to 1,500 mg a day, you can lower your blood pressure even more. Tips for success · Start small. Do not try to make dramatic changes to your diet all at once. You might feel that you are missing out on your favorite foods and then be more likely to not follow the plan. Make small changes, and stick with them. Once those changes become habit, add a few more changes. · Try some of the following: ? Make it a goal to eat a fruit or vegetable at every meal and at snacks. This will make it easy to get the recommended amount of fruits and vegetables each day. ? Try yogurt topped with fruit and nuts for a snack or healthy dessert. ? Add lettuce, tomato, cucumber, and onion to sandwiches. ? Combine a ready-made pizza crust with low-fat mozzarella cheese and lots of vegetable toppings. Try using tomatoes, squash, spinach, broccoli, carrots, cauliflower, and onions. ? Have a variety of cut-up vegetables with a low-fat dip as an appetizer instead of chips and dip. ? Sprinkle sunflower seeds or chopped almonds over salads.  Or try adding chopped walnuts or almonds to cooked vegetables. ? Try some vegetarian meals using beans and peas. Add garbanzo or kidney beans to salads. Make burritos and tacos with mashed boyle beans or black beans. Where can you learn more? Go to http://luis-jose maria.info/ Enter F424 in the search box to learn more about \"DASH Diet: Care Instructions. \" Current as of: December 16, 2019               Content Version: 12.5 © 2306-7516 [employer]. Care instructions adapted under license by [employer] (which disclaims liability or warranty for this information). If you have questions about a medical condition or this instruction, always ask your healthcare professional. [address] any warranty or liability for your use of this information.

## 2020-07-07 NOTE — PROGRESS NOTES
[address] [place] [address]               [phone]      Felecia Alvarez is a 61 y.o. female and presents with Hypertension and Thyroid Problem       Assessment/Plan:    Diagnoses and all orders for this visit:    1. Uncontrolled hypertension  -     hydroCHLOROthiazide (HYDRODIURIL) 25 mg tablet; Take 1 Tab by mouth daily. Blood pressure still elevated  When she was discharged from the hospital all her anti-hypertensive medications were discontinued  Add back HCTZ    2. Mixed hyperlipidemia  Labs in 4/20 show total cholesterol 148 and LDL 88  Continue same medications    3. History of non-ST elevation myocardial infarction (NSTEMI)  She has not followed up with cardiology  Referral placed    4. Screening mammogram, encounter for  -     YESI MAMMO BI SCREENING INCL CAD; Future  Health maintenance: mammogram ordered    5. Screening for osteoporosis  -     DEXA BONE DENSITY STUDY AXIAL; Future  Health maintenance: Dexa ordered      Follow-up and Dispositions    · Return in about 4 weeks (around 8/4/2020) for HTN, 15 min, VV. Subjective:    Hypertension:   Patient reports taking medications as instructed. yes   Medication side effects noted. no  Headache upon wakening. no   Home BP monitoring in range of 637/86 systolic. Do you experience chest pain/pressure or SOB with exertion? no  Maintain a low salt diet? yes  Key CAD CHF Meds             hydroCHLOROthiazide (HYDRODIURIL) 25 mg tablet (Taking) Take 1 Tab by mouth daily. losartan (COZAAR) 100 mg tablet (Taking) Take 1 Tab by mouth daily. atorvastatin (LIPITOR) 80 mg tablet (Taking) Take 1 Tab by mouth nightly. Indications: excessive fat in the blood, treatment to prevent a heart attack    clopidogreL (PLAVIX) 75 mg tab (Taking) Take 1 Tab by mouth daily. Indications: treatment to prevent a heart attack    metoprolol tartrate (LOPRESSOR) 50 mg tablet (Taking) Take 1 Tab by mouth two (2) times a day.  Indications: myocardial reinfarction prevention    aspirin 81 mg chewable tablet (Taking) Take 1 Tab by mouth daily. HLD:  Has been compliant with meds  all of the time  Compliant with low-fat diet. most of the time    Denies myalgias or other side effects. yes  Cholesterol, total   Date Value Ref Range Status   04/14/2020 148 140 - 199 mg/dl Final     Triglyceride   Date Value Ref Range Status   04/14/2020 150 29 - 150 mg/dl Final     HDL Cholesterol   Date Value Ref Range Status   04/14/2020 30 (L) 40 - 96 mg/dl Final     LDL, calculated   Date Value Ref Range Status   04/14/2020 88 0 - 130 mg/dl Final     Comment:     TARGET/DECISION VALUES DEPEND ON A NUMBER OF RISK FACTORS. LDL CALCULATION NOT VALID IF TRIGLYCERIDES ARE GREATER THAN 400 mg/dL.     ]  Key Antihyperlipidemia Meds             atorvastatin (LIPITOR) 80 mg tablet (Taking) Take 1 Tab by mouth nightly. Indications: excessive fat in the blood, treatment to prevent a heart attack         Coronary Artery Disease  Patient presents for routine coronary artery disease follow-up. Current symptoms: none Pain radiation: none Patient also complains of none. Pain aggravating factors: none Pain relieved by N/A. Cardiac risk factors include dyslipidemia, obesity, sedentary life style  . ROS:     ROS  As stated in HPI, otherwise all others negative. The problem list was updated as a part of today's visit. Patient Active Problem List   Diagnosis Code    Uncontrolled hypertension I10    Obesity, morbid ([facility] E66.01    CAD (coronary artery disease) I25.10    SHALA (acute kidney injury) ([facility] K06.6    Diastolic CHF ([facility] Z12.54    History of non-ST elevation myocardial infarction (NSTEMI) I25.2    Mixed hyperlipidemia E78.2       The PSH, FH were reviewed.       SH:  Social History     Tobacco Use    Smoking status: Former Smoker     Packs/day: 1.00     Years: 40.00     Pack years: 40.00    Smokeless tobacco: Never Used   Substance Use Topics    Alcohol use: No    Drug use: No       Medications/Allergies:  Current Outpatient Medications on File Prior to Visit   Medication Sig Dispense Refill    losartan (COZAAR) 100 mg tablet Take 1 Tab by mouth daily. 90 Tab 0    atorvastatin (LIPITOR) 80 mg tablet Take 1 Tab by mouth nightly. Indications: excessive fat in the blood, treatment to prevent a heart attack 30 Tab 2    clopidogreL (PLAVIX) 75 mg tab Take 1 Tab by mouth daily. Indications: treatment to prevent a heart attack 30 Tab 2    metoprolol tartrate (LOPRESSOR) 50 mg tablet Take 1 Tab by mouth two (2) times a day. Indications: myocardial reinfarction prevention 60 Tab 2    aspirin 81 mg chewable tablet Take 1 Tab by mouth daily. 90 Tab 1     No current facility-administered medications on file prior to visit. No Known Allergies    Objective:  Visit Vitals  BP (!) 181/100   Pulse 61   Temp 97.4 °F (36.3 °C) (Oral)   Resp 14   Ht 5' 5\" (1.651 m)   Wt 264 lb (119.7 kg)   SpO2 95%   BMI 43.93 kg/m²    Body mass index is 43.93 kg/m². Physical assessment  Physical Exam  Vitals signs and nursing note reviewed. Eyes:      Conjunctiva/sclera: Conjunctivae normal.      Pupils: Pupils are equal, round, and reactive to light. Neck:      Musculoskeletal: Normal range of motion. Vascular: No JVD. Cardiovascular:      Rate and Rhythm: Normal rate and regular rhythm. Heart sounds: Normal heart sounds. No murmur. No friction rub. No gallop. Pulmonary:      Effort: Pulmonary effort is normal.      Breath sounds: Normal breath sounds. Musculoskeletal: Normal range of motion. Skin:     General: Skin is warm and dry. Neurological:      Mental Status: She is alert and oriented to person, place, and time.    Psychiatric:         Mood and Affect: Affect normal.         Cognition and Memory: Memory normal.         Judgment: Judgment normal.           Labwork and Ancillary Studies:    CBC w/Diff  Lab Results   Component Value Date/Time    WBC 11.9 (H) 04/13/2020 06:18 AM    HGB 12.9 (L) 04/13/2020 06:18 AM    PLATELET 306 20/70/4613 06:18 AM         Basic Metabolic Profile  Lab Results   Component Value Date/Time    Sodium 140 04/15/2020 06:47 AM    Potassium 3.5 04/15/2020 06:47 AM    Chloride 103 04/15/2020 06:47 AM    CO2 33 (H) 04/15/2020 06:47 AM    Anion gap 4 (L) 04/15/2020 06:47 AM    Glucose 139 (H) 04/15/2020 06:47 AM    BUN 32 (H) 04/15/2020 06:47 AM    Creatinine 1.2 04/15/2020 06:47 AM    GFR est AA 59.0 04/15/2020 06:47 AM    GFR est non-AA 49 04/15/2020 06:47 AM    Calcium 8.8 04/15/2020 06:47 AM        Cholesterol  Lab Results   Component Value Date/Time    Cholesterol, total 148 04/14/2020 06:16 AM    HDL Cholesterol 30 (L) 04/14/2020 06:16 AM    LDL, calculated 88 04/14/2020 06:16 AM    Triglyceride 150 04/14/2020 06:16 AM    CHOL/HDL Ratio 4.9 (H) 04/14/2020 06:16 AM       Health Maintenance:   Health Maintenance   Topic Date Due    Hepatitis C Screening  1960    Pneumococcal 0-64 years (1 of 1 - PPSV23) 04/23/1966    DTaP/Tdap/Td series (1 - Tdap) 04/23/1981    PAP AKA CERVICAL CYTOLOGY  04/23/1981    Shingrix Vaccine Age 50> (1 of 2) 04/23/2010    Breast Cancer Screen Mammogram  04/23/2010    FOBT Q1Y Age 54-65  04/23/2010    Influenza Age 5 to Adult  08/01/2020    Lipid Screen  04/14/2021       I have discussed the diagnosis with the patient and the intended plan as seen in the above orders. The patient has received an After-Visit Summary and questions were answered concerning future plans. An After Visit Summary was printed and given to the patient. All diagnosis have been discussed with the patient and all of the patient's questions have been answered. Follow-up and Dispositions    · Return in about 4 weeks (around 8/4/2020) for HTN, 15 min, VV. Caleb Maria, [facility]  [address] [facility]   [address] [facility] [address]  [id number]

## 2020-08-05 DIAGNOSIS — I21.4 NSTEMI (NON-ST ELEVATED MYOCARDIAL INFARCTION) (HCC): ICD-10-CM

## 2020-08-05 RX ORDER — CLOPIDOGREL BISULFATE 75 MG/1
75 TABLET ORAL DAILY
Qty: 30 TAB | Refills: 2 | Status: SHIPPED | OUTPATIENT
Start: 2020-08-05 | End: 2020-11-11 | Stop reason: SDUPTHER

## 2020-08-05 NOTE — TELEPHONE ENCOUNTER
Patient called to check on the status of her medication refill request for clopidogrel. Informed patient it can take up to 72 hours to be approved. Patient states she is completely out of her medication.

## 2020-08-10 ENCOUNTER — VIRTUAL VISIT (OUTPATIENT)
Dept: FAMILY MEDICINE CLINIC | Age: 60
End: 2020-08-10

## 2020-08-10 DIAGNOSIS — I10 UNCONTROLLED HYPERTENSION: Primary | ICD-10-CM

## 2020-08-10 RX ORDER — AMLODIPINE BESYLATE 5 MG/1
5 TABLET ORAL DAILY
Qty: 90 TAB | Refills: 1 | Status: SHIPPED | OUTPATIENT
Start: 2020-08-10 | End: 2020-11-11 | Stop reason: SDUPTHER

## 2020-08-10 NOTE — PROGRESS NOTES
Chief Complaint   Patient presents with    Hypertension    Other     Due for FOBT/ Pap     1. Have you been to the ER, urgent care clinic since your last visit? Hospitalized since your last visit? No    2. Have you seen or consulted any other health care providers outside of the [address] [place] [address] since your last visit? Include any pap smears or colon screening.  No

## 2020-08-10 NOTE — PROGRESS NOTES
[address] [place] [address]               [phone]      Lucy Roblero is a 61 y.o. female who was seen by synchronous (real-time) audio-video technology on 8/10/2020. Consent: Lucy Roblero, who was seen by synchronous (real-time) audio-video technology, and/or her healthcare decision maker, is aware that this patient-initiated, Telehealth encounter on 8/10/2020 is a billable service, with coverage as determined by her insurance carrier. She is aware that she may receive a bill and has provided verbal consent to proceed: Yes. Assessment & Plan:   Diagnoses and all orders for this visit:    1. Uncontrolled hypertension  -     amLODIPine (NORVASC) 5 mg tablet; Take 1 Tab by mouth daily. blood pressure per home checks are still elevated  Add amlodipine at 5 mg daily    Follow-up and Dispositions    · Return in about 1 week (around 8/17/2020) for HTN, 15 min, VV.             712  Subjective:   Lucy Roblero is a 61 y.o. female who was seen for   Hypertension and Other (Due for FOBT/ Pap)    Hypertension:   Patient reports taking medications as instructed. yes   Medication side effects noted. no  Headache upon wakening. no   Home BP monitoring in range of 166/84, 162/82, 606/ 94'B systolic. Do you experience chest pain/pressure or SOB with exertion? no  Maintain a low salt diet? Other, most of the time  Key CAD CHF Meds             amLODIPine (NORVASC) 5 mg tablet (Taking) Take 1 Tab by mouth daily. clopidogreL (PLAVIX) 75 mg tab (Taking) Take 1 Tab by mouth daily. Indications: treatment to prevent a heart attack    hydroCHLOROthiazide (HYDRODIURIL) 25 mg tablet (Taking) Take 1 Tab by mouth daily. losartan (COZAAR) 100 mg tablet (Taking) Take 1 Tab by mouth daily. atorvastatin (LIPITOR) 80 mg tablet (Taking) Take 1 Tab by mouth nightly.  Indications: excessive fat in the blood, treatment to prevent a heart attack    metoprolol tartrate (LOPRESSOR) 50 mg tablet (Taking) Take 1 Tab by mouth two (2) times a day. Indications: myocardial reinfarction prevention    aspirin 81 mg chewable tablet (Taking) Take 1 Tab by mouth daily. Prior to Admission medications    Medication Sig Start Date End Date Taking? Authorizing Provider   amLODIPine (NORVASC) 5 mg tablet Take 1 Tab by mouth daily. 8/10/20  Yes Penny Jung NP   clopidogreL (PLAVIX) 75 mg tab Take 1 Tab by mouth daily. Indications: treatment to prevent a heart attack 8/5/20  Yes Penny Jung NP   hydroCHLOROthiazide (HYDRODIURIL) 25 mg tablet Take 1 Tab by mouth daily. 7/7/20  Yes Penny Jung NP   losartan (COZAAR) 100 mg tablet Take 1 Tab by mouth daily. 6/24/20  Yes Penny Jung NP   atorvastatin (LIPITOR) 80 mg tablet Take 1 Tab by mouth nightly. Indications: excessive fat in the blood, treatment to prevent a heart attack 4/29/20  Yes Penny Jung NP   metoprolol tartrate (LOPRESSOR) 50 mg tablet Take 1 Tab by mouth two (2) times a day. Indications: myocardial reinfarction prevention 4/29/20  Yes Penny Jung NP   aspirin 81 mg chewable tablet Take 1 Tab by mouth daily.  3/25/20  Yes Penny Jung NP     No Known Allergies    Patient Active Problem List   Diagnosis Code    Uncontrolled hypertension I10    Obesity, morbid ([address] E66.01    CAD (coronary artery disease) I25.10    SHALA (acute kidney injury) ([address] Q40.5    Diastolic CHF ([address] Y39.38    History of non-ST elevation myocardial infarction (NSTEMI) I25.2    Mixed hyperlipidemia E78.2     Patient Active Problem List    Diagnosis Date Noted    History of non-ST elevation myocardial infarction (NSTEMI) 07/07/2020    Mixed hyperlipidemia 07/07/2020    SHALA (acute kidney injury) ([address] 09/43/9416    Diastolic CHF ([address] 56/52/1965    Obesity, morbid ([address] 01/24/2020    CAD (coronary artery disease) 01/24/2020    Uncontrolled hypertension 01/25/2019     Current Outpatient Medications Medication Sig Dispense Refill    amLODIPine (NORVASC) 5 mg tablet Take 1 Tab by mouth daily. 90 Tab 1    clopidogreL (PLAVIX) 75 mg tab Take 1 Tab by mouth daily. Indications: treatment to prevent a heart attack 30 Tab 2    hydroCHLOROthiazide (HYDRODIURIL) 25 mg tablet Take 1 Tab by mouth daily. 90 Tab 1    losartan (COZAAR) 100 mg tablet Take 1 Tab by mouth daily. 90 Tab 0    atorvastatin (LIPITOR) 80 mg tablet Take 1 Tab by mouth nightly. Indications: excessive fat in the blood, treatment to prevent a heart attack 30 Tab 2    metoprolol tartrate (LOPRESSOR) 50 mg tablet Take 1 Tab by mouth two (2) times a day. Indications: myocardial reinfarction prevention 60 Tab 2    aspirin 81 mg chewable tablet Take 1 Tab by mouth daily. 80 Tab 1     No Known Allergies  Past Medical History:   Diagnosis Date    CAD (coronary artery disease)     mi 2013     Past Surgical History:   Procedure Laterality Date    HX CORONARY STENT PLACEMENT       Family History   Problem Relation Age of Onset   Hsieh Hypertension Mother     Stroke Mother     No Known Problems Father      Social History     Tobacco Use    Smoking status: Former Smoker     Packs/day: 1.00     Years: 40.00     Pack years: 40.00    Smokeless tobacco: Never Used   Substance Use Topics    Alcohol use: No       ROS  As stated in HPI, otherwise all others negative. Objective: There were no vitals taken for this visit. General: alert, cooperative, no distress   Mental  status: normal mood, behavior, speech, dress, motor activity, and thought processes, able to follow commands   HENT: NCAT   Neck: no visualized mass   Resp: no respiratory distress   Neuro: no gross deficits   Skin: no discoloration or lesions of concern on visible areas   Psychiatric: normal affect, consistent with stated mood, no evidence of hallucinations     Additional exam findings:        We discussed the expected course, resolution and complications of the diagnosis(es) in detail. Medication risks, benefits, costs, interactions, and alternatives were discussed as indicated. I advised her to contact the office if her condition worsens, changes or fails to improve as anticipated. She expressed understanding with the diagnosis(es) and plan. Elizabeth Lama is a 61 y.o. female who was evaluated by a video visit encounter for concerns as above. Patient identification was verified prior to start of the visit. A caregiver was present when appropriate. Due to this being a TeleHealth encounter (During IPCXU-20 public health emergency), evaluation of the following organ systems was limited: Vitals/Constitutional/EENT/Resp/CV/GI//MS/Neuro/Skin/Heme-Lymph-Imm. Pursuant to the emergency declaration under the [facility] waiver authority and the [employer] Act, this Virtual  Visit was conducted, with patient's (and/or legal guardian's) consent, to reduce the patient's risk of exposure to COVID-19 and provide necessary medical care. Services were provided through a video synchronous discussion virtually to substitute for in-person clinic visit. Patient and provider were located at their individual homes. An After Visit Summary was printed and given to the patient. All diagnosis have been discussed with the patient and all of the patient's questions have been answered. Follow-up and Dispositions    · Return in about 1 week (around 8/17/2020) for HTN, 15 min, VV. Rikki Jeter, [place] [id number]   [facility], [place] [address]

## 2020-08-12 DIAGNOSIS — I21.4 NSTEMI (NON-ST ELEVATED MYOCARDIAL INFARCTION) (HCC): ICD-10-CM

## 2020-08-12 DIAGNOSIS — I10 UNCONTROLLED HYPERTENSION: ICD-10-CM

## 2020-08-12 RX ORDER — METOPROLOL TARTRATE 50 MG/1
50 TABLET ORAL 2 TIMES DAILY
Qty: 60 TAB | Refills: 2 | Status: SHIPPED | OUTPATIENT
Start: 2020-08-12 | End: 2020-12-14 | Stop reason: SDUPTHER

## 2020-08-17 ENCOUNTER — VIRTUAL VISIT (OUTPATIENT)
Dept: FAMILY MEDICINE CLINIC | Age: 60
End: 2020-08-17

## 2020-08-17 DIAGNOSIS — I10 ESSENTIAL HYPERTENSION: Primary | ICD-10-CM

## 2020-08-17 PROBLEM — E66.01 OBESITY, MORBID (HCC): Status: RESOLVED | Noted: 2020-01-24 | Resolved: 2020-08-17

## 2020-08-17 NOTE — PROGRESS NOTES
Chief Complaint   Patient presents with    Hypertension     due for pap and colo- no insurance right now. 1. Have you been to the ER, urgent care clinic since your last visit? Hospitalized since your last visit? No    2. Have you seen or consulted any other health care providers outside of the [address] [place] [address] since your last visit? Include any pap smears or colon screening.  No

## 2020-08-17 NOTE — PROGRESS NOTES
[address] [place] [address]               [phone]      Spencer Monteiro is a 61 y.o. female who was seen by synchronous (real-time) audio-video technology on 8/17/2020. Consent: Spencer Monteiro, who was seen by synchronous (real-time) audio-video technology, and/or her healthcare decision maker, is aware that this patient-initiated, Telehealth encounter on 8/17/2020 is a billable service, with coverage as determined by her insurance carrier. She is aware that she may receive a bill and has provided verbal consent to proceed: Yes. Assessment & Plan:   Diagnoses and all orders for this visit:    1. Essential hypertension    blood pressures are well controlled per her home checks  Continue same mediations    Follow-up and Dispositions    · Return in about 3 months (around 11/17/2020) for HLD, HTN, VV, 30 min. 712  Subjective:   Spencer Monteiro is a 61 y.o. female who was seen for   Hypertension (due for pap and colo- no insurance right now. )    Hypertension:   Patient reports taking medications as instructed. yes   Medication side effects noted. no  Headache upon wakening. no   Home BP monitoring in range of 144/89,142/79, 126/88, 126/72    Do you experience chest pain/pressure or SOB with exertion? no  Maintain a low salt diet? yes  Key CAD CHF Meds             metoprolol tartrate (LOPRESSOR) 50 mg tablet (Taking) Take 1 Tab by mouth two (2) times a day. Indications: myocardial reinfarction prevention    amLODIPine (NORVASC) 5 mg tablet (Taking) Take 1 Tab by mouth daily. clopidogreL (PLAVIX) 75 mg tab (Taking) Take 1 Tab by mouth daily. Indications: treatment to prevent a heart attack    hydroCHLOROthiazide (HYDRODIURIL) 25 mg tablet (Taking) Take 1 Tab by mouth daily. losartan (COZAAR) 100 mg tablet (Taking) Take 1 Tab by mouth daily. atorvastatin (LIPITOR) 80 mg tablet (Taking) Take 1 Tab by mouth nightly.  Indications: excessive fat in the blood, treatment to prevent a heart attack    aspirin 81 mg chewable tablet (Taking) Take 1 Tab by mouth daily. Prior to Admission medications    Medication Sig Start Date End Date Taking? Authorizing Provider   metoprolol tartrate (LOPRESSOR) 50 mg tablet Take 1 Tab by mouth two (2) times a day. Indications: myocardial reinfarction prevention 8/12/20  Yes Alexandra Mayes NP   amLODIPine (NORVASC) 5 mg tablet Take 1 Tab by mouth daily. 8/10/20  Yes Alexandra Mayes NP   clopidogreL (PLAVIX) 75 mg tab Take 1 Tab by mouth daily. Indications: treatment to prevent a heart attack 8/5/20  Yes Alexandra Mayes NP   hydroCHLOROthiazide (HYDRODIURIL) 25 mg tablet Take 1 Tab by mouth daily. 7/7/20  Yes Alexandra Mayes NP   losartan (COZAAR) 100 mg tablet Take 1 Tab by mouth daily. 6/24/20  Yes Alexandra Mayes NP   atorvastatin (LIPITOR) 80 mg tablet Take 1 Tab by mouth nightly. Indications: excessive fat in the blood, treatment to prevent a heart attack 4/29/20  Yes Alexandra Mayes NP   aspirin 81 mg chewable tablet Take 1 Tab by mouth daily.  3/25/20  Yes Alexandra Mayes NP     No Known Allergies    Patient Active Problem List   Diagnosis Code    CAD (coronary artery disease) I25.10    [facility]ALA (acute kidney injury) ([facility] U09.6    [facility] Q39.64    History of non-ST elevation myocardial infarction (NSTEMI) I25.2    Mixed hyperlipidemia E78.2    BMI 45.0-49.9, adult ([facility] Z68.42    Essential hypertension I10     Patient Active Problem List    Diagnosis Date Noted    Essential hypertension 08/17/2020    History of non-ST elevation myocardial infarction (NSTEMI) 07/07/2020    Mixed hyperlipidemia 07/07/2020    SHALA (acute kidney injury) ([facility] 36/59/9786    Diastolic CHF ([facility] 22/91/6391    CAD (coronary artery disease) 01/24/2020    BMI 45.0-49.9, adult ([facility] 11/30/2015     Current Outpatient Medications   Medication Sig Dispense Refill    metoprolol tartrate (LOPRESSOR) 50 mg tablet Take 1 Tab by mouth two (2) times a day. Indications: myocardial reinfarction prevention 60 Tab 2    amLODIPine (NORVASC) 5 mg tablet Take 1 Tab by mouth daily. 90 Tab 1    clopidogreL (PLAVIX) 75 mg tab Take 1 Tab by mouth daily. Indications: treatment to prevent a heart attack 30 Tab 2    hydroCHLOROthiazide (HYDRODIURIL) 25 mg tablet Take 1 Tab by mouth daily. 90 Tab 1    losartan (COZAAR) 100 mg tablet Take 1 Tab by mouth daily. 90 Tab 0    atorvastatin (LIPITOR) 80 mg tablet Take 1 Tab by mouth nightly. Indications: excessive fat in the blood, treatment to prevent a heart attack 30 Tab 2    aspirin 81 mg chewable tablet Take 1 Tab by mouth daily. 80 Tab 1     No Known Allergies  Past Medical History:   Diagnosis Date    CAD (coronary artery disease)     mi 2013     Past Surgical History:   Procedure Laterality Date    HX CORONARY STENT PLACEMENT       Family History   Problem Relation Age of Onset   [address] [facility] Hypertension Mother     Stroke Mother     No Known Problems Father      Social History     Tobacco Use    Smoking status: Former Smoker     Packs/day: 1.00     Years: 40.00     Pack years: 40.00    Smokeless tobacco: Never Used   Substance Use Topics    Alcohol use: No       ROS  As stated in HPI, otherwise all others negative. Objective: There were no vitals taken for this visit. General: alert, cooperative, no distress   Mental  status: normal mood, behavior, speech, dress, motor activity, and thought processes, able to follow commands   HENT: NCAT   Neck: no visualized mass   Resp: no respiratory distress   Neuro: no gross deficits   Skin: no discoloration or lesions of concern on visible areas   Psychiatric: normal affect, consistent with stated mood, no evidence of hallucinations     Additional exam findings: We discussed the expected course, resolution and complications of the diagnosis(es) in detail.   Medication risks, benefits, costs, interactions, and alternatives were discussed as indicated. I advised her to contact the office if her condition worsens, changes or fails to improve as anticipated. She expressed understanding with the diagnosis(es) and plan. Elizabeth Lama is a 61 y.o. female who was evaluated by a video visit encounter for concerns as above. Patient identification was verified prior to start of the visit. A caregiver was present when appropriate. Due to this being a TeleHealth encounter (During [facility]-60 public health emergency), evaluation of the following organ systems was limited: Vitals/Constitutional/EENT/Resp/CV/GI//MS/Neuro/Skin/Heme-Lymph-Imm. Pursuant to the emergency declaration under the [facility], 1135 waiver authority and the [employer] Act, this Virtual  Visit was conducted, with patient's (and/or legal guardian's) consent, to reduce the patient's risk of exposure to COVID-19 and provide necessary medical care. Services were provided through a video synchronous discussion virtually to substitute for in-person clinic visit. Patient and provider were located at their individual homes. An After Visit Summary was printed and given to the patient. All diagnosis have been discussed with the patient and all of the patient's questions have been answered. Follow-up and Dispositions    · Return in about 3 months (around 11/17/2020) for HLD, HTN, VV, 30 min. [facility]ik[facility] Jeter, [facility]   Lin Downs

## 2020-08-24 DIAGNOSIS — I21.4 NSTEMI (NON-ST ELEVATED MYOCARDIAL INFARCTION) (HCC): ICD-10-CM

## 2020-08-24 RX ORDER — ATORVASTATIN CALCIUM 80 MG/1
80 TABLET, FILM COATED ORAL
Qty: 30 TAB | Refills: 2 | Status: SHIPPED | OUTPATIENT
Start: 2020-08-24 | End: 2020-12-14 | Stop reason: SDUPTHER

## 2020-09-21 DIAGNOSIS — I10 UNCONTROLLED HYPERTENSION: ICD-10-CM

## 2020-09-21 RX ORDER — LOSARTAN POTASSIUM 100 MG/1
100 TABLET ORAL DAILY
Qty: 90 TAB | Refills: 0 | Status: SHIPPED | OUTPATIENT
Start: 2020-09-21 | End: 2020-12-29 | Stop reason: SDUPTHER

## 2020-11-11 ENCOUNTER — VIRTUAL VISIT (OUTPATIENT)
Dept: FAMILY MEDICINE CLINIC | Age: 60
End: 2020-11-11
Payer: COMMERCIAL

## 2020-11-11 DIAGNOSIS — E78.2 MIXED HYPERLIPIDEMIA: ICD-10-CM

## 2020-11-11 DIAGNOSIS — M79.661 BILATERAL CALF PAIN: Primary | ICD-10-CM

## 2020-11-11 DIAGNOSIS — I21.4 NSTEMI (NON-ST ELEVATED MYOCARDIAL INFARCTION) (HCC): ICD-10-CM

## 2020-11-11 DIAGNOSIS — I10 UNCONTROLLED HYPERTENSION: ICD-10-CM

## 2020-11-11 DIAGNOSIS — M79.662 BILATERAL CALF PAIN: Primary | ICD-10-CM

## 2020-11-11 PROCEDURE — 99214 OFFICE O/P EST MOD 30 MIN: CPT | Performed by: NURSE PRACTITIONER

## 2020-11-11 RX ORDER — CLOPIDOGREL BISULFATE 75 MG/1
75 TABLET ORAL DAILY
Qty: 90 TAB | Refills: 1 | Status: SHIPPED | OUTPATIENT
Start: 2020-11-11 | End: 2021-04-07

## 2020-11-11 RX ORDER — AMLODIPINE BESYLATE 5 MG/1
5 TABLET ORAL DAILY
Qty: 90 TAB | Refills: 1 | Status: SHIPPED | OUTPATIENT
Start: 2020-11-11 | End: 2021-04-07

## 2020-11-11 NOTE — PROGRESS NOTES
Chief Complaint   Patient presents with    Hypertension    Cholesterol Problem       1. Have you been to the ER, urgent care clinic since your last visit? Hospitalized since your last visit? No    2. Have you seen or consulted any other health care providers outside of the [address] [place] [address] since your last visit? Include any pap smears or colon screening.  No

## 2020-11-11 NOTE — PROGRESS NOTES
[address] [place] [address]               [phone]      Kavon Lama is a 61 y.o. female who was seen by synchronous (real-time) audio-video technology on 11/11/2020. Consent: Kavon Lama, who was seen by synchronous (real-time) audio-video technology, and/or her healthcare decision maker, is aware that this patient-initiated, Telehealth encounter on 11/11/2020 is a billable service, with coverage as determined by her insurance carrier. She is aware that she may receive a bill and has provided verbal consent to proceed: Yes. Assessment & Plan:   Diagnoses and all orders for this visit:    1. Bilateral calf pain  New complaint today of calf pain with activity  What she describes sounds like intermittent claudication  Have her come into the office for a more complete evaluation  Will get labs at that time    2. Uncontrolled hypertension  -     amLODIPine (NORVASC) 5 mg tablet; Take 1 Tab by mouth daily. Stable per home checks, continue same medications  Refill provided    3. NSTEMI (non-ST elevated myocardial infarction) (HCC)  -     clopidogreL (PLAVIX) 75 mg tab; Take 1 Tab by mouth daily. Indications: treatment to prevent a heart attack  She is due a cardiology f/u, advised her to call them to make an appointment, she is waiting for them to call her  Refill provided    4. Mixed hyperlipidemia  Endorses med compliance  Labs at next visit    Follow-up and Dispositions    · Return for ASAP, LE claudication pain, 30 min, office only. 712  Subjective:   Kavon Lama is a 61 y.o. female who was seen for   Hypertension and Cholesterol Problem    Hypertension:   Patient reports taking medications as instructed. yes   Medication side effects noted. no  Headache upon wakening. no   Home BP monitoring in range of 021/73L systolic. Do you experience chest pain/pressure or SOB with exertion? no  Maintain a low salt diet?  yes     Key CAD CHF Meds amLODIPine (NORVASC) 5 mg tablet (Taking) Take 1 Tab by mouth daily. clopidogreL (PLAVIX) 75 mg tab (Taking) Take 1 Tab by mouth daily. Indications: treatment to prevent a heart attack    losartan (COZAAR) 100 mg tablet (Taking) Take 1 Tab by mouth daily. atorvastatin (LIPITOR) 80 mg tablet (Taking) Take 1 Tab by mouth nightly. Indications: excessive fat in the blood, treatment to prevent a heart attack    metoprolol tartrate (LOPRESSOR) 50 mg tablet (Taking) Take 1 Tab by mouth two (2) times a day. Indications: myocardial reinfarction prevention    hydroCHLOROthiazide (HYDRODIURIL) 25 mg tablet (Taking) Take 1 Tab by mouth daily. aspirin 81 mg chewable tablet (Taking) Take 1 Tab by mouth daily. HLD:  Has been compliant with meds  all of the time  Compliant with low-fat diet. most of the time    Denies myalgias or other side effects. yes  Cholesterol, total   Date Value Ref Range Status   04/14/2020 148 140 - 199 mg/dl Final     Triglyceride   Date Value Ref Range Status   04/14/2020 150 29 - 150 mg/dl Final     HDL Cholesterol   Date Value Ref Range Status   04/14/2020 30 (L) 40 - 96 mg/dl Final     LDL, calculated   Date Value Ref Range Status   04/14/2020 88 0 - 130 mg/dl Final     Comment:     TARGET/DECISION VALUES DEPEND ON A NUMBER OF RISK FACTORS. LDL CALCULATION NOT VALID IF TRIGLYCERIDES ARE GREATER THAN 400 mg/dL.     ]  Key Antihyperlipidemia Meds             atorvastatin (LIPITOR) 80 mg tablet (Taking) Take 1 Tab by mouth nightly.  Indications: excessive fat in the blood, treatment to prevent a heart attack         Calf pain  Endorses calf pain when she is walking, happens after she has been walking a little bit will build up and start hurting, pain resolves when she stops walking, happens with or without shoes, happens after goint up and down the stairs a few times, feet are warm, no color changes, denies edema  Is due for Cardiology follow up, asked her to call   Was wearing compression stockings and they helped-[employer], got them about two weeks ago, then she bought some from [employer] and they make it hurt more      Prior to Admission medications    Medication Sig Start Date End Date Taking? Authorizing Provider   amLODIPine (NORVASC) 5 mg tablet Take 1 Tab by mouth daily. 11/11/20  Yes Priscilla Parish NP   clopidogreL (PLAVIX) 75 mg tab Take 1 Tab by mouth daily. Indications: treatment to prevent a heart attack 11/11/20  Yes Priscilla Parish NP   losartan (COZAAR) 100 mg tablet Take 1 Tab by mouth daily. 9/21/20  Yes Priscilla Parish NP   atorvastatin (LIPITOR) 80 mg tablet Take 1 Tab by mouth nightly. Indications: excessive fat in the blood, treatment to prevent a heart attack 8/24/20  Yes Priscilla Parish NP   metoprolol tartrate (LOPRESSOR) 50 mg tablet Take 1 Tab by mouth two (2) times a day. Indications: myocardial reinfarction prevention 8/12/20  Yes Priscilla Parish NP   hydroCHLOROthiazide (HYDRODIURIL) 25 mg tablet Take 1 Tab by mouth daily. 7/7/20  Yes Priscilla Parish NP   aspirin 81 mg chewable tablet Take 1 Tab by mouth daily. 3/25/20  Yes Priscilla Parish NP   amLODIPine (NORVASC) 5 mg tablet Take 1 Tab by mouth daily. 8/10/20 11/11/20  Priscilla Parish NP   clopidogreL (PLAVIX) 75 mg tab Take 1 Tab by mouth daily.  Indications: treatment to prevent a heart attack 8/5/20 11/11/20  Priscilla Parish NP     No Known Allergies    Patient Active Problem List   Diagnosis Code    CAD (coronary artery disease) I25.10    SHALA (acute kidney injury) ([facility] H61.8    [facility] F47.03    History of non-ST elevation myocardial infarction (NSTEMI) I25.2    Mixed hyperlipidemia E78.2    BMI 45.0-49.9, adult ([facility] Z68.42    Essential hypertension I10     Past Surgical History:   Procedure Laterality Date    HX CORONARY STENT PLACEMENT       Family History   Problem Relation Age of Onset    Hypertension Mother     Stroke Mother  No Known Problems Father      Social History     Tobacco Use    Smoking status: Former Smoker     Packs/day: 1.00     Years: 40.00     Pack years: 40.00    Smokeless tobacco: Never Used   Substance Use Topics    Alcohol use: No       ROS  As stated in HPI, otherwise all others negative. Objective: There were no vitals taken for this visit. General: alert, cooperative, no distress   Mental  status: normal mood, behavior, speech, dress, motor activity, and thought processes, able to follow commands   HENT: NCAT   Neck: no visualized mass   Resp: no respiratory distress   Neuro: no gross deficits   Skin: no discoloration or lesions of concern on visible areas   Psychiatric: normal affect, consistent with stated mood, no evidence of hallucinations     Additional exam findings: We discussed the expected course, resolution and complications of the diagnosis(es) in detail. Medication risks, benefits, costs, interactions, and alternatives were discussed as indicated. I advised her to contact the office if her condition worsens, changes or fails to improve as anticipated. She expressed understanding with the diagnosis(es) and plan. Romario Quinn is a 61 y.o. female who was evaluated by a video visit encounter for concerns as above. Patient identification was verified prior to start of the visit. A caregiver was present when appropriate. Due to this being a TeleHealth encounter (During [facility] public health emergency), evaluation of the following organ systems was limited: Vitals/Constitutional/EENT/Resp/CV/GI//MS/Neuro/Skin/Heme-Lymph-Imm.   Pursuant to the emergency declaration under the [facility], 1135 waiver authority and the [employer] Act, this Virtual  Visit was conducted, with patient's (and/or legal guardian's) consent, to reduce the patient's risk of exposure to COVID-19 and provide necessary medical care. Services were provided through a video synchronous discussion virtually to substitute for in-person clinic visit. Patient and provider were located at their individual homes. An After Visit Summary was printed and given to the patient. All diagnosis have been discussed with the patient and all of the patient's questions have been answered. Follow-up and Dispositions    · Return for ASAP, LE claudication pain, 30 min, office only. [facility]   Lin Downs

## 2020-12-14 DIAGNOSIS — I10 UNCONTROLLED HYPERTENSION: ICD-10-CM

## 2020-12-14 DIAGNOSIS — I21.4 NSTEMI (NON-ST ELEVATED MYOCARDIAL INFARCTION) (HCC): ICD-10-CM

## 2020-12-14 RX ORDER — METOPROLOL TARTRATE 50 MG/1
50 TABLET ORAL 2 TIMES DAILY
Qty: 60 TAB | Refills: 2 | Status: SHIPPED | OUTPATIENT
Start: 2020-12-14 | End: 2021-04-07

## 2020-12-14 RX ORDER — ATORVASTATIN CALCIUM 80 MG/1
80 TABLET, FILM COATED ORAL
Qty: 30 TAB | Refills: 2 | Status: SHIPPED | OUTPATIENT
Start: 2020-12-14 | End: 2021-04-07

## 2020-12-29 DIAGNOSIS — I10 UNCONTROLLED HYPERTENSION: ICD-10-CM

## 2020-12-29 RX ORDER — LOSARTAN POTASSIUM 100 MG/1
100 TABLET ORAL DAILY
Qty: 90 TAB | Refills: 0 | Status: SHIPPED | OUTPATIENT
Start: 2020-12-29 | End: 2021-03-03

## 2020-12-29 RX ORDER — HYDROCHLOROTHIAZIDE 25 MG/1
25 TABLET ORAL DAILY
Qty: 90 TAB | Refills: 1 | Status: SHIPPED | OUTPATIENT
Start: 2020-12-29 | End: 2021-04-07

## 2021-01-05 ENCOUNTER — HOSPITAL ENCOUNTER (OUTPATIENT)
Dept: LAB | Age: 61
Discharge: HOME | End: 2021-01-05

## 2021-01-05 ENCOUNTER — OFFICE VISIT (OUTPATIENT)
Dept: FAMILY MEDICINE CLINIC | Age: 61
End: 2021-01-05

## 2021-01-05 VITALS
TEMPERATURE: 97.9 F | RESPIRATION RATE: 12 BRPM | HEIGHT: 65 IN | HEART RATE: 56 BPM | OXYGEN SATURATION: 94 % | SYSTOLIC BLOOD PRESSURE: 136 MMHG | BODY MASS INDEX: 47.15 KG/M2 | DIASTOLIC BLOOD PRESSURE: 69 MMHG | WEIGHT: 283 LBS

## 2021-01-05 DIAGNOSIS — I25.119 CORONARY ARTERY DISEASE INVOLVING NATIVE HEART WITH ANGINA PECTORIS, UNSPECIFIED VESSEL OR LESION TYPE (HCC): ICD-10-CM

## 2021-01-05 DIAGNOSIS — I10 ESSENTIAL HYPERTENSION: ICD-10-CM

## 2021-01-05 DIAGNOSIS — R07.89 OTHER CHEST PAIN: ICD-10-CM

## 2021-01-05 DIAGNOSIS — E78.2 MIXED HYPERLIPIDEMIA: ICD-10-CM

## 2021-01-05 DIAGNOSIS — I10 ESSENTIAL HYPERTENSION: Primary | ICD-10-CM

## 2021-01-05 DIAGNOSIS — I25.2 HISTORY OF NON-ST ELEVATION MYOCARDIAL INFARCTION (NSTEMI): ICD-10-CM

## 2021-01-05 DIAGNOSIS — R06.02 SOB (SHORTNESS OF BREATH) ON EXERTION: ICD-10-CM

## 2021-01-05 LAB
ALBUMIN SERPL-MCNC: 3.5 G/DL (ref 3.4–5)
ALBUMIN/GLOB SERPL: 0.8 {RATIO} (ref 0.8–1.7)
ALP SERPL-CCNC: 68 U/L (ref 45–117)
ALT SERPL-CCNC: 25 U/L (ref 13–56)
ANION GAP SERPL CALC-SCNC: 5 MMOL/L (ref 3–18)
AST SERPL-CCNC: 13 U/L (ref 10–38)
BILIRUB SERPL-MCNC: 0.6 MG/DL (ref 0.2–1)
BUN SERPL-MCNC: 16 MG/DL (ref 7–18)
BUN/CREAT SERPL: 20 (ref 12–20)
CALCIUM SERPL-MCNC: 9.4 MG/DL (ref 8.5–10.1)
CHLORIDE SERPL-SCNC: 100 MMOL/L (ref 100–111)
CHOLEST SERPL-MCNC: 170 MG/DL
CO2 SERPL-SCNC: 38 MMOL/L (ref 21–32)
CREAT SERPL-MCNC: 0.79 MG/DL (ref 0.6–1.3)
ERYTHROCYTE [DISTWIDTH] IN BLOOD BY AUTOMATED COUNT: 15.6 % (ref 11.6–14.5)
GLOBULIN SER CALC-MCNC: 4.6 G/DL (ref 2–4)
GLUCOSE SERPL-MCNC: 94 MG/DL (ref 74–99)
HCT VFR BLD AUTO: 46.5 % (ref 35–45)
HDLC SERPL-MCNC: 48 MG/DL (ref 40–60)
HDLC SERPL: 3.5 {RATIO} (ref 0–5)
HGB BLD-MCNC: 14.3 G/DL (ref 12–16)
LDLC SERPL CALC-MCNC: 107.2 MG/DL (ref 0–100)
LIPID PROFILE,FLP: ABNORMAL
MCH RBC QN AUTO: 29.7 PG (ref 24–34)
MCHC RBC AUTO-ENTMCNC: 30.8 G/DL (ref 31–37)
MCV RBC AUTO: 96.7 FL (ref 74–97)
PLATELET # BLD AUTO: 286 K/UL (ref 135–420)
PMV BLD AUTO: 11.5 FL (ref 9.2–11.8)
POTASSIUM SERPL-SCNC: 3.9 MMOL/L (ref 3.5–5.5)
PROT SERPL-MCNC: 8.1 G/DL (ref 6.4–8.2)
RBC # BLD AUTO: 4.81 M/UL (ref 4.2–5.3)
SODIUM SERPL-SCNC: 143 MMOL/L (ref 136–145)
TRIGL SERPL-MCNC: 74 MG/DL (ref ?–150)
TROPONIN I SERPL-MCNC: <0.02 NG/ML (ref 0–0.04)
VLDLC SERPL CALC-MCNC: 14.8 MG/DL
WBC # BLD AUTO: 5.5 K/UL (ref 4.6–13.2)

## 2021-01-05 PROCEDURE — 93000 ELECTROCARDIOGRAM COMPLETE: CPT | Performed by: NURSE PRACTITIONER

## 2021-01-05 PROCEDURE — 85027 COMPLETE CBC AUTOMATED: CPT

## 2021-01-05 PROCEDURE — 80053 COMPREHEN METABOLIC PANEL: CPT

## 2021-01-05 PROCEDURE — 84484 ASSAY OF TROPONIN QUANT: CPT

## 2021-01-05 PROCEDURE — 99214 OFFICE O/P EST MOD 30 MIN: CPT | Performed by: NURSE PRACTITIONER

## 2021-01-05 PROCEDURE — 36415 COLL VENOUS BLD VENIPUNCTURE: CPT

## 2021-01-05 PROCEDURE — 82552 ASSAY OF CPK IN BLOOD: CPT

## 2021-01-05 PROCEDURE — 80061 LIPID PANEL: CPT

## 2021-01-05 NOTE — PROGRESS NOTES
[address] [place] [address]               [phone]      Ricarda Saavedra is a 61 y.o. female and presents with Leg Pain (LE pain bilateral- Denies swelling x 3 months)       Assessment/Plan:    Diagnoses and all orders for this visit:    1. Essential hypertension  -     METABOLIC PANEL, COMPREHENSIVE; Future  Blood pressure stable, continue same medications  Endorses medication compliance  Labs today to evaluate hepatic and renal function    2. Mixed hyperlipidemia  -     LIPID PANEL; Future  Endorses medication compliance  Follow-up lipid levels today    3. Other chest pain  -     AMB POC EKG ROUTINE W/ 12 LEADS, INTER & REP  -     CBC W/O DIFF; Future  -     TROPONIN I; Future  -     CK ISOENZYMES; Future  -     REFERRAL TO CARDIOLOGY  Endorses chest pressure with activity  Obtain twelve-lead EKG in office today, difficulty with baseline artifact however she is overall in a sinus bradycardia sinus rhythm with a first-degree AV block which could be related to her beta-blocker usage  Obtain labs to evaluate cardiac enzymes  Due to her past history of NSTEMI will refer her to cardiology for further evaluation, after her hospitalization for the NSTEMI she never did follow-up with cardiology due to not having any insurance  4. SOB (shortness of breath) on exertion  -     CBC W/O DIFF; Future  -     TROPONIN I; Future  -     CK ISOENZYMES; Future  -     REFERRAL TO CARDIOLOGY    5. Coronary artery disease involving native heart with angina pectoris, unspecified vessel or lesion type ([address]  -     REFERRAL TO CARDIOLOGY    6. History of non-ST elevation myocardial infarction (NSTEMI)  -     REFERRAL TO CARDIOLOGY        Follow-up and Dispositions    · Return in about 3 months (around 4/5/2021) for HLD, HTN, weight loss, office only, 30 min. Subjective:    Currently without insurance. Is back working senior [employer] and has to wait 90 days to get insurance.  She started there 11/2020. Hypertension:   Patient reports taking medications as instructed. yes   Medication side effects noted. no  Headache upon wakening. no   Home BP monitoring in range of 686/85, 552/65'G systolic. Do you experience chest pain/pressure or SOB with exertion? yes, gets short of breath and sometims her chest gets a little tight, she will stop and relax and the problem goes away. Has gained 20 pounds since 6/2020. Maintain a low salt diet? yes  Key CAD CHF Meds             hydroCHLOROthiazide (HYDRODIURIL) 25 mg tablet (Taking) Take 1 Tab by mouth daily. losartan (COZAAR) 100 mg tablet (Taking) Take 1 Tab by mouth daily. metoprolol tartrate (LOPRESSOR) 50 mg tablet (Taking) Take 1 Tab by mouth two (2) times a day. Indications: myocardial reinfarction prevention    atorvastatin (LIPITOR) 80 mg tablet (Taking) Take 1 Tab by mouth nightly. Indications: excessive fat in the blood, treatment to prevent a heart attack    amLODIPine (NORVASC) 5 mg tablet (Taking) Take 1 Tab by mouth daily. clopidogreL (PLAVIX) 75 mg tab (Taking) Take 1 Tab by mouth daily. Indications: treatment to prevent a heart attack    aspirin 81 mg chewable tablet (Taking) Take 1 Tab by mouth daily. HLD:  Has been compliant with meds  all of the time  Compliant with low-fat diet. occasionally    Denies myalgias or other side effects. yes  Cholesterol, total   Date Value Ref Range Status   01/05/2021 170 <200 MG/DL Final     Triglyceride   Date Value Ref Range Status   01/05/2021 74 <150 MG/DL Final     Comment:     The drugs N-acetylcysteine (NAC) and  Metamiszole have been found to cause falsely  low results in this chemical assay. Please  be sure to submit blood samples obtained  BEFORE administration of either of these  drugs to assure correct results.        HDL Cholesterol   Date Value Ref Range Status   01/05/2021 48 40 - 60 MG/DL Final     LDL, calculated   Date Value Ref Range Status   01/05/2021 107.2 (H) 0 - 100 MG/DL Final   ]  Key Antihyperlipidemia Meds             atorvastatin (LIPITOR) 80 mg tablet (Taking) Take 1 Tab by mouth nightly. Indications: excessive fat in the blood, treatment to prevent a heart attack        Calf pain  Symptoms are intermittent  Endorses: burning pain that happens in both calves; sometimes bilat and sometimes unilat, worse in LLE, the pain happens at rest and with activity, sometimes symptoms are there when she wakes up  Treatments: wearing compression stockings: used to make it feel better and then was aggrivating the problem so she stopped  Onset: about 6 weeks ago       Chest Pain  gets short of breath and sometims her chest gets a little tight, she will stop and relax and the problem goes away  Has never seen cardiology since her MI 4/2020        ROS:     ROS  As stated in HPI, otherwise all others negative. The problem list was updated as a part of today's visit. Patient Active Problem List   Diagnosis Code    CAD (coronary artery disease) I25.10    SHALA (acute kidney injury) ([facility]) B91.0    Diastolic CHF ([facility]) H30.18    History of non-ST elevation myocardial infarction (NSTEMI) I25.2    Mixed hyperlipidemia E78.2    BMI 45.0-49.9, adult ([facility]) Z68.42    Essential hypertension I10       The PSH, FH were reviewed. SH:  Social History     Tobacco Use    Smoking status: Former Smoker     Packs/day: 1.00     Years: 40.00     Pack years: 40.00    Smokeless tobacco: Never Used   Substance Use Topics    Alcohol use: No    Drug use: No       Medications/Allergies:  Current Outpatient Medications on File Prior to Visit   Medication Sig Dispense Refill    hydroCHLOROthiazide (HYDRODIURIL) 25 mg tablet Take 1 Tab by mouth daily. 90 Tab 1    losartan (COZAAR) 100 mg tablet Take 1 Tab by mouth daily. 90 Tab 0    metoprolol tartrate (LOPRESSOR) 50 mg tablet Take 1 Tab by mouth two (2) times a day.  Indications: myocardial reinfarction prevention 60 Tab 2    atorvastatin (LIPITOR) 80 mg tablet Take 1 Tab by mouth nightly. Indications: excessive fat in the blood, treatment to prevent a heart attack 30 Tab 2    amLODIPine (NORVASC) 5 mg tablet Take 1 Tab by mouth daily. 90 Tab 1    clopidogreL (PLAVIX) 75 mg tab Take 1 Tab by mouth daily. Indications: treatment to prevent a heart attack 90 Tab 1    aspirin 81 mg chewable tablet Take 1 Tab by mouth daily. 90 Tab 1     No current facility-administered medications on file prior to visit. No Known Allergies    Objective:  Visit Vitals  /69   Pulse (!) 56   Temp 97.9 °F (36.6 °C) (Oral)   Resp 12   Ht 5' 5\" (1.651 m)   Wt 283 lb (128.4 kg)   SpO2 94%   BMI 47.09 kg/m²    Body mass index is 47.09 kg/m². Physical assessment  Physical Exam  Vitals signs and nursing note reviewed. Eyes:      Conjunctiva/sclera: Conjunctivae normal.      Pupils: Pupils are equal, round, and reactive to light. Neck:      Musculoskeletal: Normal range of motion. Vascular: No JVD. Cardiovascular:      Rate and Rhythm: Normal rate and regular rhythm. Heart sounds: Normal heart sounds. No murmur. No friction rub. No gallop. Pulmonary:      Effort: Pulmonary effort is normal.      Breath sounds: Normal breath sounds. Comments: Shortness of breath noted with activity  Musculoskeletal: Normal range of motion. Skin:     General: Skin is warm and dry. Neurological:      Mental Status: She is alert and oriented to person, place, and time.    Psychiatric:         Mood and Affect: Affect normal.         Cognition and Memory: Memory normal.         Judgment: Judgment normal.           Labwork and Ancillary Studies:    CBC w/Diff  Lab Results   Component Value Date/Time    WBC 5.5 01/05/2021 01:34 PM    HGB 14.3 01/05/2021 01:34 PM    PLATELET 895 53/14/9149 01:34 PM         Basic Metabolic Profile  Lab Results   Component Value Date/Time    Sodium 143 01/05/2021 01:34 PM    Potassium 3.9 01/05/2021 01:34 PM    Chloride 100 01/05/2021 01:34 PM CO2 38 (H) 01/05/2021 01:34 PM    Anion gap 5 01/05/2021 01:34 PM    Glucose 94 01/05/2021 01:34 PM    BUN 16 01/05/2021 01:34 PM    Creatinine 0.79 01/05/2021 01:34 PM    BUN/Creatinine ratio 20 01/05/2021 01:34 PM    GFR est AA >60 01/05/2021 01:34 PM    GFR est non-AA >60 01/05/2021 01:34 PM    Calcium 9.4 01/05/2021 01:34 PM        Cholesterol  Lab Results   Component Value Date/Time    Cholesterol, total 170 01/05/2021 01:34 PM    HDL Cholesterol 48 01/05/2021 01:34 PM    LDL, calculated 107.2 (H) 01/05/2021 01:34 PM    Triglyceride 74 01/05/2021 01:34 PM    CHOL/HDL Ratio 3.5 01/05/2021 01:34 PM       Health Maintenance:   Health Maintenance   Topic Date Due    Hepatitis C Screening  1960    Pneumococcal 0-64 years (1 of 1 - PPSV23) 04/23/1966    PAP AKA CERVICAL CYTOLOGY  04/23/1981    Shingrix Vaccine Age 50> (1 of 2) 04/23/2010    Colorectal Cancer Screening Combo  04/23/2010    Breast Cancer Screen Mammogram  04/23/2010    Flu Vaccine (1) 09/01/2020    Lipid Screen  01/05/2022    DTaP/Tdap/Td series (2 - Td) 03/30/2025       I have discussed the diagnosis with the patient and the intended plan as seen in the above orders. The patient has received an After-Visit Summary and questions were answered concerning future plans. An After Visit Summary was printed and given to the patient. All diagnosis have been discussed with the patient and all of the patient's questions have been answered. Follow-up and Dispositions    · Return in about 3 months (around 4/5/2021) for HLD, HTN, weight loss, office only, 30 min. Kristen Valero, AGNP-BC  [address] [place] [address]  [id number]

## 2021-01-05 NOTE — LETTER
NOTIFICATION RETURN TO WORK / SCHOOL 
 
1/5/2021 12:53 PM 
 
Ms. Karina Velasquez 
[address] To Whom It May Concern: 
 
Karina Velasquez is currently under the care of Radha Awad. She will return to work/school on: 1/5/2021. If there are questions or concerns please have the patient contact our office. Sincerely, Mable Patterson NP

## 2021-01-05 NOTE — PROGRESS NOTES
Chief Complaint   Patient presents with    Leg Pain     LE pain bilateral- Denies swelling x 3 months       1. Have you been to the ER, urgent care clinic since your last visit? Hospitalized since your last visit? NO    2. Have you seen or consulted any other health care providers outside of the [address] [place] [address] since your last visit? Include any pap smears or colon screening.  No

## 2021-01-09 LAB
CK BB CFR SERPL ELPH: 0 %
CK MACRO1 CFR SERPL: 0 %
CK MACRO2 CFR SERPL: 0 %
CK MB CFR SERPL ELPH: 0 % (ref 0–3)
CK MM CFR SERPL ELPH: 100 % (ref 97–100)
CK SERPL-CCNC: 86 U/L (ref 32–182)

## 2021-02-25 ENCOUNTER — TELEPHONE (OUTPATIENT)
Dept: FAMILY MEDICINE CLINIC | Age: 61
End: 2021-02-25

## 2021-04-09 ENCOUNTER — VIRTUAL VISIT (OUTPATIENT)
Dept: FAMILY MEDICINE CLINIC | Age: 61
End: 2021-04-09

## 2021-04-09 DIAGNOSIS — R07.89 OTHER CHEST PAIN: ICD-10-CM

## 2021-04-09 DIAGNOSIS — I10 ESSENTIAL HYPERTENSION: Primary | ICD-10-CM

## 2021-04-09 DIAGNOSIS — E78.2 MIXED HYPERLIPIDEMIA: ICD-10-CM

## 2021-04-09 PROCEDURE — 99214 OFFICE O/P EST MOD 30 MIN: CPT | Performed by: NURSE PRACTITIONER

## 2021-04-09 RX ORDER — BUPROPION HYDROCHLORIDE 150 MG/1
150 TABLET ORAL
Qty: 30 TAB | Refills: 0 | Status: SHIPPED
Start: 2021-04-09 | End: 2021-12-06

## 2021-04-09 NOTE — PROGRESS NOTES
Patient virtual appointment scheduled for 9:00am.     1st attempt- [phone] was called No answer. Left message to return call for check in prior to virtual appointment. [phone] this number is incorrect and patient stated she does not have anything wrong with her back. 1. Have you been to the ER, urgent care clinic since your last visit? Hospitalized since your last visit? No    2. Have you seen or consulted any other health care providers outside of the [address] [place] [address] since your last visit? Include any pap smears or colon screening.  No      Health Maintenance Due   Topic Date Due    Hepatitis C Screening  Never done    Pneumococcal 0-64 years (1 of 1 - PPSV23) Never done    COVID-19 Vaccine (1) Never done    PAP AKA CERVICAL CYTOLOGY  Never done    Shingrix Vaccine Age 50> (1 of 2) Never done    Colorectal Cancer Screening Combo  Never done    Breast Cancer Screen Mammogram  Never done

## 2021-04-09 NOTE — PROGRESS NOTES
[address] [place] [address]               [phone]      Yolande Nam is a 61 y.o. female who was seen by synchronous (real-time) audio-video technology on 4/9/2021. Consent: Yolande Nam, who was seen by synchronous (real-time) audio-video technology, and/or her healthcare decision maker, is aware that this patient-initiated, Telehealth encounter on 4/9/2021 is a billable service, with coverage as determined by her insurance carrier. She is aware that she may receive a bill and has provided verbal consent to proceed: Yes. Assessment & Plan:   Diagnoses and all orders for this visit:    1. Essential hypertension  Endorses medication compliance  Home blood pressure checks are stable, continue same medications  Recently had labs done January 2021, no significant abnormalities    2. Mixed hyperlipidemia  Endorses medication compliance  Recently had labs done in January 2021, lipids are stable continue same medications  Denies abdominal pain or symptoms of myalgia    3. BMI 45.0-49.9, adult (HCC)  -     buPROPion XL (WELLBUTRIN XL) 150 mg tablet; Take 1 Tab by mouth every morning. Endorses a desire to lose weight however, she feels like she cannot do it on her own  With her past cardiac history will try her on Wellbutrin, counseled her that the medication is just an assistance to weight loss she still has to work on her diet and exercise  She verbalized understanding    4. Other chest pain  At her last visit she stated she did experience some chest pressure with exertion, I referred her to cardiology however she did not follow-up.   In fact she has not seen cardiologist since she had her MI  She states she currently has no insurance so she cannot afford a cardiology office visit  At this visit she states with activity she gets out of breath but does not have any chest pain or pressure and she relates her shortness of breath to her weight  Informed her to monitor for any serious signs and symptoms that could be cardiac related including chest pain/pressure, sweating, nausea, pain and jaw, neck, shoulders and or back  She verbalized understanding    She is due a mammogram however due to the fact that she has no insurance she has not been able to go and have one performed, I was able to give her the number to [employer] that will assist patients with the cost of mammograms and quoted for her the self-pay price for mammogram through [employer]    Follow-up and Dispositions    · Return in about 4 weeks (around 5/7/2021) for weight loss, New med, 15 min, VV.             712  Subjective:     Health Maintenance Due   Topic Date Due    Hepatitis C Screening  Never done    Pneumococcal 0-64 years (1 of 1 - PPSV23) Never done    COVID-19 Vaccine (1) Never done    PAP AKA CERVICAL CYTOLOGY  Never done    Shingrix Vaccine Age 50> (1 of 2) Never done    Colorectal Cancer Screening Combo  Never done    Breast Cancer Screen Mammogram  Never done             Manuel Carver is a 61 y.o. female who was seen for   Follow Up Chronic Condition (leg pain bilteral)    Chest pain with exertion  Referral to cardiology, did not follow up  Has no insurance    Weight issues  States she wants to lose weight but feels like she cannot do it on her own, needs help  Has started riding a bike some in the parking lot      Hypertension:   Patient reports taking medications as instructed. yes   Medication side effects noted. no  Headache upon wakening. no   Home BP monitoring in range of 132/70    Do you experience chest pain/pressure or SOB with exertion? Comes and goes, she thinks it is the weight, gets SOB, no chest pressure/pain  Maintain a low salt diet? yes  Key CAD CHF Meds             clopidogreL (PLAVIX) 75 mg tab (Taking) TAKE ONE TABLET BY MOUTH DAILY    metoprolol tartrate (LOPRESSOR) 50 mg tablet (Taking) TAKE ONE TABLET BY MOUTH TWICE A DAY.  INDICATIONS: MYOCARDIAL REINFARCTION PREVENTION. atorvastatin (LIPITOR) 80 mg tablet (Taking) TAKE ONE TABLET BY MOUTH EVERY NIGHT  INDICATIONS: EXCESSIVE FAT IN THE BLOOD, TREATMENT TO PREVENT A HEART ATTACK    hydroCHLOROthiazide (HYDRODIURIL) 25 mg tablet (Taking) TAKE ONE TABLET BY MOUTH DAILY    amLODIPine (NORVASC) 5 mg tablet (Taking) TAKE ONE TABLET BY MOUTH DAILY    losartan (COZAAR) 100 mg tablet (Taking) TAKE ONE TABLET BY MOUTH DAILY    aspirin 81 mg chewable tablet (Taking) Take 1 Tab by mouth daily. HLD:  Has been compliant with meds  all of the time  Compliant with low-fat diet. most of the time    Denies myalgias or other side effects. yes  Cholesterol, total   Date Value Ref Range Status   01/05/2021 170 <200 MG/DL Final     Triglyceride   Date Value Ref Range Status   01/05/2021 74 <150 MG/DL Final     Comment:     The drugs N-acetylcysteine (NAC) and  Metamiszole have been found to cause falsely  low results in this chemical assay. Please  be sure to submit blood samples obtained  BEFORE administration of either of these  drugs to assure correct results. HDL Cholesterol   Date Value Ref Range Status   01/05/2021 48 40 - 60 MG/DL Final     LDL, calculated   Date Value Ref Range Status   01/05/2021 107.2 (H) 0 - 100 MG/DL Final   ]  Key Antihyperlipidemia Meds             atorvastatin (LIPITOR) 80 mg tablet (Taking) TAKE ONE TABLET BY MOUTH EVERY NIGHT  INDICATIONS: EXCESSIVE FAT IN THE BLOOD, TREATMENT TO PREVENT A HEART ATTACK             Prior to Admission medications    Medication Sig Start Date End Date Taking? Authorizing Provider   buPROPion XL (WELLBUTRIN XL) 150 mg tablet Take 1 Tab by mouth every morning. 4/9/21  Yes Eddy Mcdonough NP   clopidogreL (PLAVIX) 75 mg tab TAKE ONE TABLET BY MOUTH DAILY 4/7/21  Yes Eddy Mcdonough NP   metoprolol tartrate (LOPRESSOR) 50 mg tablet TAKE ONE TABLET BY MOUTH TWICE A DAY.  INDICATIONS: MYOCARDIAL REINFARCTION PREVENTION. 4/7/21  Yes Eddy Mcdonough NP atorvastatin (LIPITOR) 80 mg tablet TAKE ONE TABLET BY MOUTH EVERY NIGHT  INDICATIONS: EXCESSIVE FAT IN THE BLOOD, TREATMENT TO PREVENT A HEART ATTACK 4/7/21  Yes Shakira Ortega NP   hydroCHLOROthiazide (HYDRODIURIL) 25 mg tablet TAKE ONE TABLET BY MOUTH DAILY 4/7/21  Yes Shakira Ortega NP   amLODIPine (NORVASC) 5 mg tablet TAKE ONE TABLET BY MOUTH DAILY 4/7/21  Yes Shakira Ortega NP   losartan (COZAAR) 100 mg tablet TAKE ONE TABLET BY MOUTH DAILY 3/3/21  Yes Mel Veras MD   aspirin 81 mg chewable tablet Take 1 Tab by mouth daily. 3/25/20  Yes Shakira Ortega NP     No Known Allergies    Patient Active Problem List   Diagnosis Code    CAD (coronary artery disease) I25.10    SHALA (acute kidney injury) ([facility] [address] A71.7    [facility] F59.60    History of non-ST elevation myocardial infarction (NSTEMI) I25.2    Mixed hyperlipidemia E78.2    BMI 45.0-49.9, adult ([facility] [address] Z68.42    Essential hypertension I10     Past Surgical History:   Procedure Laterality Date    HX CORONARY STENT PLACEMENT       Family History   Problem Relation Age of Onset    Hypertension Mother     Stroke Mother     No Known Problems Father      Social History     Tobacco Use    Smoking status: Former Smoker     Packs/day: 1.00     Years: 40.00     Pack years: 40.00    Smokeless tobacco: Never Used   Substance Use Topics    Alcohol use: No       ROS  As stated in HPI, otherwise all others negative. Objective: There were no vitals taken for this visit. General: alert, cooperative, no distress   Mental  status: normal mood, behavior, speech, dress, motor activity, and thought processes, able to follow commands   HENT: NCAT   Neck: no visualized mass   Resp: no respiratory distress   Neuro: no gross deficits   Skin: no discoloration or lesions of concern on visible areas   Psychiatric: normal affect, consistent with stated mood, no evidence of hallucinations     Additional exam findings:        We discussed the expected course, resolution and complications of the diagnosis(es) in detail. Medication risks, benefits, costs, interactions, and alternatives were discussed as indicated. I advised her to contact the office if her condition worsens, changes or fails to improve as anticipated. She expressed understanding with the diagnosis(es) and plan. Ethan Wilkins is a 61 y.o. female who was evaluated by a video visit encounter for concerns as above. Patient identification was verified prior to start of the visit. A caregiver was present when appropriate. Due to this being a TeleHealth encounter (During [facility] public [facility] emergency), evaluation of the following organ systems was limited: Vitals/Constitutional/EENT/Resp/CV/GI//MS/Neuro/Skin/Heme-Lymph-Imm. Pursuant to the emergency declaration under the [facility], [facility] waiver authority and the [employer] Act, this Virtual  Visit was conducted, with patient's (and/or legal guardian's) consent, to reduce the patient's risk of exposure to COVID-19 and provide necessary medical care. Services were provided through a video synchronous discussion virtually to substitute for in-person clinic visit. Patient and provider were located at their individual homes. An After Visit Summary was printed and given to the patient. All diagnosis have been discussed with the patient and all of the patient's questions have been answered. Follow-up and Dispositions    · Return in about 4 weeks (around 5/7/2021) for weight loss, New med, 15 min, VV. [facility] [place].   Lin Downs

## 2021-06-11 DIAGNOSIS — I21.4 NSTEMI (NON-ST ELEVATED MYOCARDIAL INFARCTION) (HCC): ICD-10-CM

## 2021-06-11 DIAGNOSIS — I10 UNCONTROLLED HYPERTENSION: ICD-10-CM

## 2021-06-11 RX ORDER — AMLODIPINE BESYLATE 5 MG/1
TABLET ORAL
Qty: 90 TABLET | Refills: 0 | Status: SHIPPED | OUTPATIENT
Start: 2021-06-11 | End: 2021-07-12

## 2021-06-11 RX ORDER — CLOPIDOGREL BISULFATE 75 MG/1
TABLET ORAL
Qty: 90 TABLET | Refills: 0 | Status: SHIPPED | OUTPATIENT
Start: 2021-06-11 | End: 2021-07-12

## 2021-06-11 RX ORDER — HYDROCHLOROTHIAZIDE 25 MG/1
TABLET ORAL
Qty: 90 TABLET | Refills: 0 | Status: SHIPPED | OUTPATIENT
Start: 2021-06-11 | End: 2021-07-12

## 2021-06-14 DIAGNOSIS — I10 UNCONTROLLED HYPERTENSION: ICD-10-CM

## 2021-06-14 RX ORDER — LOSARTAN POTASSIUM 100 MG/1
TABLET ORAL
Qty: 90 TABLET | Refills: 0 | Status: SHIPPED | OUTPATIENT
Start: 2021-06-14 | End: 2021-07-09

## 2021-07-06 DIAGNOSIS — I10 UNCONTROLLED HYPERTENSION: ICD-10-CM

## 2021-07-09 RX ORDER — LOSARTAN POTASSIUM 100 MG/1
TABLET ORAL
Qty: 90 TABLET | Refills: 0 | Status: SHIPPED | OUTPATIENT
Start: 2021-07-09 | End: 2022-02-14 | Stop reason: SDUPTHER

## 2021-07-10 DIAGNOSIS — I10 UNCONTROLLED HYPERTENSION: ICD-10-CM

## 2021-07-12 DIAGNOSIS — I10 UNCONTROLLED HYPERTENSION: ICD-10-CM

## 2021-07-12 DIAGNOSIS — I21.4 NSTEMI (NON-ST ELEVATED MYOCARDIAL INFARCTION) (HCC): ICD-10-CM

## 2021-07-12 RX ORDER — HYDROCHLOROTHIAZIDE 25 MG/1
TABLET ORAL
Qty: 90 TABLET | Refills: 0 | Status: SHIPPED | OUTPATIENT
Start: 2021-07-12 | End: 2021-12-13

## 2021-07-12 RX ORDER — CLOPIDOGREL BISULFATE 75 MG/1
TABLET ORAL
Qty: 90 TABLET | Refills: 0 | Status: SHIPPED | OUTPATIENT
Start: 2021-07-12 | End: 2021-12-13

## 2021-07-12 RX ORDER — AMLODIPINE BESYLATE 5 MG/1
TABLET ORAL
Qty: 90 TABLET | Refills: 0 | Status: SHIPPED | OUTPATIENT
Start: 2021-07-12 | End: 2021-12-13

## 2021-07-19 RX ORDER — LOSARTAN POTASSIUM 100 MG/1
TABLET ORAL
Qty: 90 TABLET | Refills: 0 | OUTPATIENT
Start: 2021-07-19

## 2021-11-05 DIAGNOSIS — I21.4 NSTEMI (NON-ST ELEVATED MYOCARDIAL INFARCTION) (HCC): ICD-10-CM

## 2021-11-05 DIAGNOSIS — I10 UNCONTROLLED HYPERTENSION: ICD-10-CM

## 2021-11-05 RX ORDER — ATORVASTATIN CALCIUM 80 MG/1
TABLET, FILM COATED ORAL
Qty: 90 TABLET | Refills: 0 | Status: SHIPPED | OUTPATIENT
Start: 2021-11-05 | End: 2022-01-17

## 2021-11-05 RX ORDER — METOPROLOL TARTRATE 50 MG/1
TABLET ORAL
Qty: 180 TABLET | Refills: 0 | Status: SHIPPED | OUTPATIENT
Start: 2021-11-05 | End: 2022-01-17

## 2021-11-10 ENCOUNTER — CLINICAL SUPPORT (OUTPATIENT)
Dept: FAMILY MEDICINE CLINIC | Age: 61
End: 2021-11-10

## 2021-11-10 VITALS
TEMPERATURE: 97.7 F | HEART RATE: 72 BPM | DIASTOLIC BLOOD PRESSURE: 88 MMHG | RESPIRATION RATE: 20 BRPM | OXYGEN SATURATION: 82 % | SYSTOLIC BLOOD PRESSURE: 138 MMHG

## 2021-11-10 DIAGNOSIS — I10 ESSENTIAL HYPERTENSION: Primary | ICD-10-CM

## 2021-12-06 ENCOUNTER — HOSPITAL ENCOUNTER (OUTPATIENT)
Dept: LAB | Age: 61
Discharge: HOME | End: 2021-12-06

## 2021-12-06 ENCOUNTER — OFFICE VISIT (OUTPATIENT)
Dept: FAMILY MEDICINE CLINIC | Age: 61
End: 2021-12-06
Payer: COMMERCIAL

## 2021-12-06 VITALS
TEMPERATURE: 98.2 F | BODY MASS INDEX: 46.48 KG/M2 | RESPIRATION RATE: 20 BRPM | DIASTOLIC BLOOD PRESSURE: 88 MMHG | OXYGEN SATURATION: 90 % | HEART RATE: 62 BPM | WEIGHT: 279 LBS | HEIGHT: 65 IN | SYSTOLIC BLOOD PRESSURE: 136 MMHG

## 2021-12-06 DIAGNOSIS — I50.32 CHRONIC DIASTOLIC CONGESTIVE HEART FAILURE (HCC): ICD-10-CM

## 2021-12-06 DIAGNOSIS — E78.2 MIXED HYPERLIPIDEMIA: ICD-10-CM

## 2021-12-06 DIAGNOSIS — Z11.59 NEED FOR HEPATITIS C SCREENING TEST: ICD-10-CM

## 2021-12-06 DIAGNOSIS — I10 ESSENTIAL HYPERTENSION: ICD-10-CM

## 2021-12-06 DIAGNOSIS — I10 ESSENTIAL HYPERTENSION: Primary | ICD-10-CM

## 2021-12-06 DIAGNOSIS — Z12.31 SCREENING MAMMOGRAM, ENCOUNTER FOR: ICD-10-CM

## 2021-12-06 DIAGNOSIS — I25.2 HISTORY OF NON-ST ELEVATION MYOCARDIAL INFARCTION (NSTEMI): ICD-10-CM

## 2021-12-06 PROBLEM — N17.9 AKI (ACUTE KIDNEY INJURY) (HCC): Status: RESOLVED | Noted: 2020-04-15 | Resolved: 2021-12-06

## 2021-12-06 LAB
ALBUMIN SERPL-MCNC: 3.4 G/DL (ref 3.4–5)
ALBUMIN/GLOB SERPL: 0.8 {RATIO} (ref 0.8–1.7)
ALP SERPL-CCNC: 63 U/L (ref 45–117)
ALT SERPL-CCNC: 28 U/L (ref 13–56)
ANION GAP SERPL CALC-SCNC: 6 MMOL/L (ref 3–18)
AST SERPL-CCNC: 31 U/L (ref 10–38)
BILIRUB SERPL-MCNC: 0.6 MG/DL (ref 0.2–1)
BUN SERPL-MCNC: 27 MG/DL (ref 7–18)
BUN/CREAT SERPL: 28 (ref 12–20)
CALCIUM SERPL-MCNC: 9.6 MG/DL (ref 8.5–10.1)
CHLORIDE SERPL-SCNC: 99 MMOL/L (ref 100–111)
CHOLEST SERPL-MCNC: 157 MG/DL
CO2 SERPL-SCNC: 37 MMOL/L (ref 21–32)
CREAT SERPL-MCNC: 0.98 MG/DL (ref 0.6–1.3)
GLOBULIN SER CALC-MCNC: 4.4 G/DL (ref 2–4)
GLUCOSE SERPL-MCNC: 96 MG/DL (ref 74–99)
HDLC SERPL-MCNC: 49 MG/DL (ref 40–60)
HDLC SERPL: 3.2 {RATIO} (ref 0–5)
LDLC SERPL CALC-MCNC: 91 MG/DL (ref 0–100)
LIPID PROFILE,FLP: NORMAL
POTASSIUM SERPL-SCNC: 4.2 MMOL/L (ref 3.5–5.5)
PROT SERPL-MCNC: 7.8 G/DL (ref 6.4–8.2)
SODIUM SERPL-SCNC: 142 MMOL/L (ref 136–145)
TRIGL SERPL-MCNC: 85 MG/DL (ref ?–150)
VLDLC SERPL CALC-MCNC: 17 MG/DL

## 2021-12-06 PROCEDURE — 80061 LIPID PANEL: CPT

## 2021-12-06 PROCEDURE — 99214 OFFICE O/P EST MOD 30 MIN: CPT | Performed by: NURSE PRACTITIONER

## 2021-12-06 PROCEDURE — 86803 HEPATITIS C AB TEST: CPT

## 2021-12-06 PROCEDURE — 80053 COMPREHEN METABOLIC PANEL: CPT

## 2021-12-06 PROCEDURE — 36415 COLL VENOUS BLD VENIPUNCTURE: CPT

## 2021-12-06 NOTE — PROGRESS NOTES
Room 9    Did patient bring someone? No    Did the patient have DME equipment? No    Did you take your medication today? Yes      1. Have you been to the ER, urgent care clinic since your last visit? Hospitalized since your last visit? No    2. Have you seen or consulted any other health care providers outside of the [address] [place] [address] since your last visit? Include any pap smears or colon screening.  No    3 most recent PHQ Screens 12/6/2021   Little interest or pleasure in doing things Not at all   Feeling down, depressed, irritable, or hopeless Several days   Total Score PHQ 2 1         Health Maintenance Due   Topic Date Due    Hepatitis C Screening  Never done    Cervical cancer screen  Never done    Colorectal Cancer Screening Combo  Never done    Shingrix Vaccine Age 50> (1 of 2) Never done    Breast Cancer Screen Mammogram  Never done    COVID-19 Vaccine (3 - Booster for Leane Oas series) 08/04/2021    Flu Vaccine (1) Never done       Learning Assessment 1/24/2020   PRIMARY LEARNER Patient   HIGHEST LEVEL OF EDUCATION - PRIMARY LEARNER  GRADUATED HIGH SCHOOL OR GED   BARRIERS PRIMARY LEARNER NONE   CO-LEARNER CAREGIVER No   PRIMARY LANGUAGE ENGLISH   LEARNER PREFERENCE PRIMARY LISTENING   ANSWERED BY Deirdre Cavazos   RELATIONSHIP SELF

## 2021-12-06 NOTE — PROGRESS NOTES
[address] [place] [address]               [phone]      Kevin Allen is a 64 y.o. female and presents with Follow-up       Assessment/Plan:    Diagnoses and all orders for this visit:    1. Essential hypertension  -     METABOLIC PANEL, COMPREHENSIVE; Future  Endorses medication compliance, Follow-up labs today and Blood pressure stable, continue same medications   2. Mixed hyperlipidemia  -     LIPID PANEL; Future  Endorses medication compliance, Follow-up labs today and Denies abdominal pain or symptoms of myalgia   3. History of non-ST elevation myocardial infarction (NSTEMI)  -     REFERRAL TO CARDIOLOGY  has never f/u with cardiology since her MI, denies any s/s of MI or CHF, however, would like her to have cardiology evaluation and managment if needed   4. Chronic diastolic congestive heart failure (HCC)  -     REFERRAL TO CARDIOLOGY    5. Need for hepatitis C screening test  -     HEPATITIS C AB; Future  Health maintenance needs addressed   6. Screening mammogram, encounter for  -     [facility] MAMMO BI SCREENING INCL CAD; Future  Health maintenance needs addressed       Follow-up and Dispositions    · Return in about 3 months (around 3/6/2022) for CPE, w/gyn, 30 min, office only. Health Maintenance:   Health Maintenance   Topic Date Due    Cervical cancer screen  Never done    Colorectal Cancer Screening Combo  Never done    Shingrix Vaccine Age 50> (1 of 2) Never done    Breast Cancer Screen Mammogram  Never done    COVID-19 Vaccine (3 - Booster for Pfizer series) 08/04/2021    Flu Vaccine (1) Never done    Lipid Screen  12/06/2022    DTaP/Tdap/Td series (2 - Td or Tdap) 03/30/2025    Hepatitis C Screening  Completed    Pneumococcal 0-64 years  Aged Out        Subjective:    Hypertension:   Patient reports taking medications as instructed. yes   Medication side effects noted. no  Headache upon wakening.  no   Home BP monitoring in range of 138/62's systolic. Do you experience chest pain/pressure or SOB with exertion? no  Maintain a low Sodium diet? yes  Key CAD CHF Meds             metoprolol tartrate (LOPRESSOR) 50 mg tablet (Taking) TAKE ONE TABLET BY MOUTH TWICE A DAY. INDICATIONS: MYOCARDIAL REINFARCTION PREVENTION. atorvastatin (LIPITOR) 80 mg tablet (Taking) TAKE ONE TABLET BY MOUTH EVERY NIGHT  INDICATIONS: EXCESSIVE FAT IN THE BLOOD, TREATMENT TO PREVENT A HEART ATTACK    clopidogreL (PLAVIX) 75 mg tab (Taking/Discontinued) TAKE ONE TABLET BY MOUTH DAILY    amLODIPine (NORVASC) 5 mg tablet (Taking/Discontinued) TAKE ONE TABLET BY MOUTH DAILY    hydroCHLOROthiazide (HYDRODIURIL) 25 mg tablet (Taking/Discontinued) TAKE ONE TABLET BY MOUTH DAILY    losartan (COZAAR) 100 mg tablet (Taking) TAKE ONE TABLET BY MOUTH DAILY    aspirin 81 mg chewable tablet (Taking) Take 1 Tab by mouth daily. BUN   Date Value Ref Range Status   12/06/2021 27 (H) 7.0 - 18 MG/DL Final     Creatinine   Date Value Ref Range Status   12/06/2021 0.98 0.6 - 1.3 MG/DL Final     GFR est AA   Date Value Ref Range Status   12/06/2021 >60 >60 ml/min/1.73m2 Final     Potassium   Date Value Ref Range Status   12/06/2021 4.2 3.5 - 5.5 mmol/L Final       HLD:  Has been compliant with meds  Yes  Compliant with low-fat diet. most of the time    Denies myalgias or other side effects. yes  The ASCVD Risk score (Allison Bryan., et al., 2013) failed to calculate for the following reasons: The patient has a prior MI or stroke diagnosis    Cholesterol, total   Date Value Ref Range Status   12/06/2021 157 <200 MG/DL Final     Triglyceride   Date Value Ref Range Status   12/06/2021 85 <150 MG/DL Final     Comment:     The drugs N-acetylcysteine (NAC) and  Metamiszole have been found to cause falsely  low results in this chemical assay. Please  be sure to submit blood samples obtained  BEFORE administration of either of these  drugs to assure correct results.        HDL Cholesterol   Date Value Ref Range Status   12/06/2021 49 40 - 60 MG/DL Final     LDL, calculated   Date Value Ref Range Status   12/06/2021 91 0 - 100 MG/DL Final   ]  Key Antihyperlipidemia Meds             atorvastatin (LIPITOR) 80 mg tablet (Taking) TAKE ONE TABLET BY MOUTH EVERY NIGHT  INDICATIONS: EXCESSIVE FAT IN THE BLOOD, TREATMENT TO PREVENT A HEART ATTACK           ROS:     ROS  As stated in HPI, otherwise all others negative. The problem list was updated as a part of today's visit. Patient Active Problem List   Diagnosis Code    CAD (coronary artery disease) T73.05    [facility]astolic [facility] ([facility] D98.32    History of non-ST elevation myocardial infarction (NSTEMI) I25.2    Mixed hyperlipidemia E78.2    BMI 45.0-49.9, adult ([facility] Z68.42    Essential hypertension I10       The PSH, FH were reviewed. SH:  Social History     Tobacco Use    Smoking status: Former Smoker     Packs/day: 1.00     Years: 40.00     Pack years: 40.00    Smokeless tobacco: Never Used   Substance Use Topics    Alcohol use: No    Drug use: No       Medications/Allergies:  Current Outpatient Medications on File Prior to Visit   Medication Sig Dispense Refill    metoprolol tartrate (LOPRESSOR) 50 mg tablet TAKE ONE TABLET BY MOUTH TWICE A DAY. INDICATIONS: MYOCARDIAL REINFARCTION PREVENTION. 180 Tablet 0    atorvastatin (LIPITOR) 80 mg tablet TAKE ONE TABLET BY MOUTH EVERY NIGHT  INDICATIONS: EXCESSIVE FAT IN THE BLOOD, TREATMENT TO PREVENT A HEART ATTACK 90 Tablet 0    losartan (COZAAR) 100 mg tablet TAKE ONE TABLET BY MOUTH DAILY 90 Tablet 0    aspirin 81 mg chewable tablet Take 1 Tab by mouth daily. 90 Tab 1     No current facility-administered medications on file prior to visit.         No Known Allergies    Objective:  Visit Vitals  /88 (BP 1 Location: Right arm, BP Patient Position: Sitting, BP Cuff Size: Adult long)   Pulse 62   Temp 98.2 °F (36.8 °C) (Temporal)   Resp 20   Ht 5' 5\" (1.651 m)   Wt 279 lb (126.6 kg)   SpO2 90% BMI 46.43 kg/m²    Body mass index is 46.43 kg/m². Physical assessment  Physical Exam  Vitals and nursing note reviewed. Eyes:      Conjunctiva/sclera: Conjunctivae normal.      Pupils: Pupils are equal, round, and reactive to light. Neck:      Vascular: No JVD. Cardiovascular:      Rate and Rhythm: Normal rate and regular rhythm. Heart sounds: Normal heart sounds. No murmur heard. No friction rub. No gallop. Pulmonary:      Effort: Pulmonary effort is normal.      Breath sounds: Normal breath sounds. Musculoskeletal:         General: Normal range of motion. Cervical back: Normal range of motion. Skin:     General: Skin is warm and dry. Neurological:      Mental Status: She is alert and oriented to person, place, and time.    Psychiatric:         Mood and Affect: Affect normal.         Cognition and Memory: Memory normal.         Judgment: Judgment normal.           Labwork and Ancillary Studies:    CBC w/Diff  Lab Results   Component Value Date/Time    WBC 5.5 01/05/2021 01:34 PM    HGB 14.3 01/05/2021 01:34 PM    PLATELET 565 50/13/8372 01:34 PM         Basic Metabolic Profile  Lab Results   Component Value Date/Time    Sodium 142 12/06/2021 01:32 PM    Potassium 4.2 12/06/2021 01:32 PM    Chloride 99 (L) 12/06/2021 01:32 PM    CO2 37 (H) 12/06/2021 01:32 PM    Anion gap 6 12/06/2021 01:32 PM    Glucose 96 12/06/2021 01:32 PM    BUN 27 (H) 12/06/2021 01:32 PM    Creatinine 0.98 12/06/2021 01:32 PM    BUN/Creatinine ratio 28 (H) 12/06/2021 01:32 PM    GFR est AA >60 12/06/2021 01:32 PM    GFR est non-AA 58 (L) 12/06/2021 01:32 PM    Calcium 9.6 12/06/2021 01:32 PM        Cholesterol  Lab Results   Component Value Date/Time    Cholesterol, total 157 12/06/2021 01:32 PM    HDL Cholesterol 49 12/06/2021 01:32 PM    LDL, calculated 91 12/06/2021 01:32 PM    Triglyceride 85 12/06/2021 01:32 PM    CHOL/HDL Ratio 3.2 12/06/2021 01:32 PM           I have discussed the diagnosis with the patient and the intended plan as seen in the above orders. The patient has received an After-Visit Summary and questions were answered concerning future plans. An After Visit Summary was printed and given to the patient. All diagnosis have been discussed with the patient and all of the patient's questions have been answered. Follow-up and Dispositions    · Return in about 3 months (around 3/6/2022) for CPE, w/gyn, 30 min, office only. Jess Garcia, AGNP-BC  [address]  [id number]

## 2021-12-06 NOTE — PATIENT INSTRUCTIONS
DASH Diet: Care Instructions  Your Care Instructions     The DASH diet is an eating plan that can help lower your blood pressure. DASH stands for Dietary Approaches to Stop Hypertension. Hypertension is high blood pressure. The DASH diet focuses on eating foods that are high in calcium, potassium, and magnesium. These nutrients can lower blood pressure. The foods that are highest in these nutrients are fruits, vegetables, low-fat dairy products, nuts, seeds, and legumes. But taking calcium, potassium, and magnesium supplements instead of eating foods that are high in those nutrients does not have the same effect. The DASH diet also includes whole grains, fish, and poultry. The DASH diet is one of several lifestyle changes your doctor may recommend to lower your high blood pressure. Your doctor may also want you to decrease the amount of sodium in your diet. Lowering sodium while following the DASH diet can lower blood pressure even further than just the DASH diet alone. Follow-up care is a key part of your treatment and safety. Be sure to make and go to all appointments, and call your doctor if you are having problems. It's also a good idea to know your test results and keep a list of the medicines you take. How can you care for yourself at home? Following the DASH diet  · Eat 4 to 5 servings of fruit each day. A serving is 1 medium-sized piece of fruit, ½ cup chopped or canned fruit, 1/4 cup dried fruit, or 4 ounces (½ cup) of fruit juice. Choose fruit more often than fruit juice. · Eat 4 to 5 servings of vegetables each day. A serving is 1 cup of lettuce or raw leafy vegetables, ½ cup of chopped or cooked vegetables, or 4 ounces (½ cup) of vegetable juice. Choose vegetables more often than vegetable juice. · Get 2 to 3 servings of low-fat and fat-free dairy each day. A serving is 8 ounces of milk, 1 cup of yogurt, or 1 ½ ounces of cheese. · Eat 6 to 8 servings of grains each day.  A serving is 1 slice of bread, 1 ounce of dry cereal, or ½ cup of cooked rice, pasta, or cooked cereal. Try to choose whole-grain products as much as possible. · Limit lean meat, poultry, and fish to 2 servings each day. A serving is 3 ounces, about the size of a deck of cards. · Eat 4 to 5 servings of nuts, seeds, and legumes (cooked dried beans, lentils, and split peas) each week. A serving is 1/3 cup of nuts, 2 tablespoons of seeds, or ½ cup of cooked beans or peas. · Limit fats and oils to 2 to 3 servings each day. A serving is 1 teaspoon of vegetable oil or 2 tablespoons of salad dressing. · Limit sweets and added sugars to 5 servings or less a week. A serving is 1 tablespoon jelly or jam, ½ cup sorbet, or 1 cup of lemonade. · Eat less than 2,300 milligrams (mg) of sodium a day. If you limit your sodium to 1,500 mg a day, you can lower your blood pressure even more. Tips for success  · Start small. Do not try to make dramatic changes to your diet all at once. You might feel that you are missing out on your favorite foods and then be more likely to not follow the plan. Make small changes, and stick with them. Once those changes become habit, add a few more changes. · Try some of the following:  ? Make it a goal to eat a fruit or vegetable at every meal and at snacks. This will make it easy to get the recommended amount of fruits and vegetables each day. ? Try yogurt topped with fruit and nuts for a snack or healthy dessert. ? Add lettuce, tomato, cucumber, and onion to sandwiches. ? Combine a ready-made pizza crust with low-fat mozzarella cheese and lots of vegetable toppings. Try using tomatoes, squash, spinach, broccoli, carrots, cauliflower, and onions. ? Have a variety of cut-up vegetables with a low-fat dip as an appetizer instead of chips and dip. ? Sprinkle sunflower seeds or chopped almonds over salads. Or try adding chopped walnuts or almonds to cooked vegetables.   ? Try some vegetarian meals using beans and peas. Add garbanzo or kidney beans to salads. Make burritos and tacos with mashed boyle beans or black beans. Where can you learn more? Go to http://www.[employer]on[employer].com/  Enter H967 in the search box to learn more about \"DASH Diet: Care Instructions. \"  Current as of: December 16, 2019               Content Version: 12.6  © 8807-1871 [employer]. Care instructions adapted under license by [employer] (which disclaims liability or warranty for this information). If you have questions about a medical condition or this instruction, always ask your healthcare professional. [address] any warranty or liability for your use of this information.

## 2021-12-07 LAB
HCV AB SER IA-ACNC: <0.02 INDEX
HCV AB SERPL QL IA: NEGATIVE
HCV COMMENT,HCGAC: NORMAL

## 2022-01-06 ENCOUNTER — TELEPHONE (OUTPATIENT)
Dept: FAMILY MEDICINE CLINIC | Age: 62
End: 2022-01-06

## 2022-01-06 NOTE — TELEPHONE ENCOUNTER
----- Message from Lesleigh Mohs sent at 1/6/2022 12:34 PM EST -----  Subject: Message to Provider    QUESTIONS  Information for Provider? pt is in need of a clearance note to go back to   work. Pt states that she was not feeling well and she tested for covid   twice and both was negative. Pt had symptoms of weakness. Pt said that she   feels alot better and would like to return on tomorrow 1/7 to work. Pt   would like it sent to her employer directly at fax number [phone] . Please contact pt regarding this message. ---------------------------------------------------------------------------  --------------  [employer] INFO  What is the best way for the office to contact you? OK to leave message on   voicemail  Preferred Call Back Phone Number? [phone]  ---------------------------------------------------------------------------  --------------  SCRIPT ANSWERS  Relationship to Patient?  Self

## 2022-01-06 NOTE — LETTER
NOTIFICATION RETURN TO WORK / SCHOOL    1/10/2022 10:20 AM    Ms. Nba Bach  [address] [phone]      To Whom It May Concern:    Nba Bach is currently under the care of Radha Awad. She will return to work/school on: 1/7/2022. If there are questions or concerns please have the patient contact our office.         Sincerely,      Jase Mcrae NP

## 2022-01-17 DIAGNOSIS — I10 UNCONTROLLED HYPERTENSION: ICD-10-CM

## 2022-01-17 DIAGNOSIS — I21.4 NSTEMI (NON-ST ELEVATED MYOCARDIAL INFARCTION) (HCC): ICD-10-CM

## 2022-01-17 RX ORDER — ATORVASTATIN CALCIUM 80 MG/1
TABLET, FILM COATED ORAL
Qty: 90 TABLET | Refills: 0 | Status: SHIPPED | OUTPATIENT
Start: 2022-01-17 | End: 2022-04-19

## 2022-01-17 RX ORDER — METOPROLOL TARTRATE 50 MG/1
TABLET ORAL
Qty: 180 TABLET | Refills: 0 | Status: SHIPPED | OUTPATIENT
Start: 2022-01-17 | End: 2022-04-19

## 2022-02-14 DIAGNOSIS — I10 UNCONTROLLED HYPERTENSION: ICD-10-CM

## 2022-02-14 RX ORDER — LOSARTAN POTASSIUM 100 MG/1
100 TABLET ORAL DAILY
Qty: 90 TABLET | Refills: 0 | Status: SHIPPED | OUTPATIENT
Start: 2022-02-14 | End: 2022-05-05 | Stop reason: SDUPTHER

## 2022-03-08 ENCOUNTER — OFFICE VISIT (OUTPATIENT)
Dept: CARDIOLOGY CLINIC | Age: 62
End: 2022-03-08
Payer: COMMERCIAL

## 2022-03-08 VITALS
RESPIRATION RATE: 20 BRPM | TEMPERATURE: 98.2 F | HEIGHT: 65 IN | WEIGHT: 275.4 LBS | OXYGEN SATURATION: 93 % | DIASTOLIC BLOOD PRESSURE: 71 MMHG | BODY MASS INDEX: 45.88 KG/M2 | HEART RATE: 71 BPM | SYSTOLIC BLOOD PRESSURE: 107 MMHG

## 2022-03-08 DIAGNOSIS — R06.00 DYSPNEA, UNSPECIFIED TYPE: ICD-10-CM

## 2022-03-08 DIAGNOSIS — I50.30 DIASTOLIC CONGESTIVE HEART FAILURE, UNSPECIFIED HF CHRONICITY (HCC): Primary | ICD-10-CM

## 2022-03-08 PROCEDURE — 93000 ELECTROCARDIOGRAM COMPLETE: CPT | Performed by: INTERNAL MEDICINE

## 2022-03-08 PROCEDURE — 99204 OFFICE O/P NEW MOD 45 MIN: CPT | Performed by: INTERNAL MEDICINE

## 2022-03-08 NOTE — LETTER
3/8/2022    Patient: Clara Lester   YOB: 1960   Date of Visit: 3/8/2022     Isauro Rodriguez NP  [address]  [facility] [address]  Located within [facility] [address] [id number]  [address] [facility] [address]    Dear Isauro Rodriguez NP,      Thank you for referring Ms. Clara Lester to [facility] AT [facility] for evaluation. My notes for this consultation are attached. If you have questions, please do not hesitate to call me. I look forward to following your patient along with you.       Sincerely,    Christie Felipe MD

## 2022-03-09 ENCOUNTER — OFFICE VISIT (OUTPATIENT)
Dept: FAMILY MEDICINE CLINIC | Age: 62
End: 2022-03-09
Payer: COMMERCIAL

## 2022-03-09 ENCOUNTER — HOSPITAL ENCOUNTER (OUTPATIENT)
Dept: LAB | Age: 62
Discharge: HOME | End: 2022-03-09
Payer: COMMERCIAL

## 2022-03-09 VITALS
DIASTOLIC BLOOD PRESSURE: 82 MMHG | BODY MASS INDEX: 45.52 KG/M2 | OXYGEN SATURATION: 82 % | HEIGHT: 65 IN | WEIGHT: 273.2 LBS | SYSTOLIC BLOOD PRESSURE: 126 MMHG | RESPIRATION RATE: 20 BRPM | TEMPERATURE: 98 F | HEART RATE: 64 BPM

## 2022-03-09 DIAGNOSIS — I10 ESSENTIAL HYPERTENSION: Primary | ICD-10-CM

## 2022-03-09 DIAGNOSIS — I50.32 CHRONIC DIASTOLIC CONGESTIVE HEART FAILURE (HCC): ICD-10-CM

## 2022-03-09 DIAGNOSIS — E78.2 MIXED HYPERLIPIDEMIA: ICD-10-CM

## 2022-03-09 DIAGNOSIS — R25.2 BILATERAL LEG CRAMPS: ICD-10-CM

## 2022-03-09 DIAGNOSIS — Z12.11 COLON CANCER SCREENING: ICD-10-CM

## 2022-03-09 DIAGNOSIS — I10 ESSENTIAL HYPERTENSION: ICD-10-CM

## 2022-03-09 LAB
ALBUMIN SERPL-MCNC: 3.5 G/DL (ref 3.4–5)
ALBUMIN/GLOB SERPL: 0.8 {RATIO} (ref 0.8–1.7)
ALP SERPL-CCNC: 56 U/L (ref 45–117)
ALT SERPL-CCNC: 27 U/L (ref 13–56)
ANION GAP SERPL CALC-SCNC: 5 MMOL/L (ref 3–18)
AST SERPL-CCNC: 12 U/L (ref 10–38)
BILIRUB SERPL-MCNC: 0.6 MG/DL (ref 0.2–1)
BUN SERPL-MCNC: 25 MG/DL (ref 7–18)
BUN/CREAT SERPL: 30 (ref 12–20)
CALCIUM SERPL-MCNC: 9.6 MG/DL (ref 8.5–10.1)
CHLORIDE SERPL-SCNC: 101 MMOL/L (ref 100–111)
CHOLEST SERPL-MCNC: 159 MG/DL
CO2 SERPL-SCNC: 36 MMOL/L (ref 21–32)
CREAT SERPL-MCNC: 0.82 MG/DL (ref 0.6–1.3)
GLOBULIN SER CALC-MCNC: 4.2 G/DL (ref 2–4)
GLUCOSE SERPL-MCNC: 113 MG/DL (ref 74–99)
HDLC SERPL-MCNC: 43 MG/DL (ref 40–60)
HDLC SERPL: 3.7 {RATIO} (ref 0–5)
LDLC SERPL CALC-MCNC: 101.8 MG/DL (ref 0–100)
LIPID PROFILE,FLP: ABNORMAL
POTASSIUM SERPL-SCNC: 3.9 MMOL/L (ref 3.5–5.5)
PROT SERPL-MCNC: 7.7 G/DL (ref 6.4–8.2)
SODIUM SERPL-SCNC: 142 MMOL/L (ref 136–145)
TRIGL SERPL-MCNC: 71 MG/DL (ref ?–150)
VLDLC SERPL CALC-MCNC: 14.2 MG/DL

## 2022-03-09 PROCEDURE — 80053 COMPREHEN METABOLIC PANEL: CPT

## 2022-03-09 PROCEDURE — 80061 LIPID PANEL: CPT

## 2022-03-09 PROCEDURE — 36415 COLL VENOUS BLD VENIPUNCTURE: CPT

## 2022-03-09 PROCEDURE — 99214 OFFICE O/P EST MOD 30 MIN: CPT | Performed by: NURSE PRACTITIONER

## 2022-03-09 NOTE — PROGRESS NOTES
[address] [place] [address]               [phone]      Raj Martínez is a 64 y.o. female and presents with Complete Physical and Gyn Exam       Assessment/Plan:    Diagnoses and all orders for this visit:    1. Essential hypertension  -     METABOLIC PANEL, COMPREHENSIVE; Future  Endorses medication compliance and Follow-up labs today, blood pressure stable   2. Mixed hyperlipidemia  -     LIPID PANEL; Future  Endorses medication compliance, Follow-up labs today and Denies abdominal pain or symptoms of myalgia   3. Chronic diastolic congestive heart failure (HCC)  Assessment & Plan:   well controlled, continue current medications    Seen by cardiology yesterday, she is to have an echo, Per cardio OK to stop plavix, there are no other concerns to be on this, will DC  Patient verbalized understanding and is in agreement with this plan of care  4. Colon cancer screening  -     COLOGUARD TEST (FECAL DNA COLORECTAL CANCER SCREENING)  Health maintenance needs addressed   5. Bilateral leg cramps  We discussed possible causes: not drinking enough fluid, increased activity, shoes she is wearing  Hand out on stretches and exercises for her legs and lower back provided in AVS  Patient verbalized understanding and is in agreement with this plan of care    Follow-up and Dispositions    · Return in about 4 months (around 7/9/2022) for HLD, HTN, 15 min, office only.            Health Maintenance:   Health Maintenance   Topic Date Due    Pneumococcal 0-64 years (1 of 2 - PPSV23) Never done    Cervical cancer screen  Never done    Colorectal Cancer Screening Combo  Never done    Shingrix Vaccine Age 50> (1 of 2) Never done    Flu Vaccine (1) Never done    Breast Cancer Screen Mammogram  07/09/2022 (Originally 4/23/2010)    Depression Screen  12/06/2022    Lipid Screen  12/06/2022    DTaP/Tdap/Td series (2 - Td or Tdap) 03/30/2025    Hepatitis C Screening  Completed    COVID-19 Vaccine  Completed        Subjective:    Labs obtained prior to visit? NO  Reviewed with patient? Not applicable    Legs  Getting cramps  Happens all the time  Treatment: tylenol  Went to ED: was given muscle relaxers  Problem is long standing with sudden onset  Pain in lower back recent about a month ago  PMH: obesity    Hypertension:  Visit Vitals  /82   Pulse 64   Temp 98 °F (36.7 °C) (Temporal)   Resp 20   Ht 5' 5\" (1.651 m)   Wt 273 lb 3.2 oz (123.9 kg)   SpO2 (!) 82%   BMI 45.46 kg/m²      Patient reports taking medications as instructed. yes   Medication side effects noted. no  Headache upon wakening. no   Home BP monitorin/72  Do you experience chest pain/pressure or SOB with exertion? no  Maintain a low Sodium diet? yes  Key CAD CHF Meds             losartan (COZAAR) 100 mg tablet (Taking) Take 1 Tablet by mouth daily. metoprolol tartrate (LOPRESSOR) 50 mg tablet (Taking) TAKE ONE TABLET BY MOUTH TWICE A DAY FOR MYOCARDIAL REINFACTION PREVENTION    atorvastatin (LIPITOR) 80 mg tablet (Taking) TAKE ONE TABLET BY MOUTH DAILY AT NIGHT. INDICATIONS: EXCESSIVE FAT IN THE BLOOD, TREATMENT TO PREVENT A HEART ATTACK    amLODIPine (NORVASC) 5 mg tablet (Taking) TAKE ONE TABLET BY MOUTH DAILY    hydroCHLOROthiazide (HYDRODIURIL) 25 mg tablet (Taking) TAKE ONE TABLET BY MOUTH DAILY    aspirin 81 mg chewable tablet (Taking) Take 1 Tab by mouth daily. BUN   Date Value Ref Range Status   2021 27 (H) 7.0 - 18 MG/DL Final     Creatinine   Date Value Ref Range Status   2021 0.98 0.6 - 1.3 MG/DL Final     GFR est AA   Date Value Ref Range Status   2021 >60 >60 ml/min/1.73m2 Final     Potassium   Date Value Ref Range Status   2021 4.2 3.5 - 5.5 mmol/L Final       HLD:  Has been compliant with meds  Yes  Compliant with low-fat diet. occasionally    Denies myalgias or other side effects.  yes  The ASCVD Risk score (Jaxon Christopher., et al., 2013) failed to calculate for the following reasons: The patient has a prior MI or stroke diagnosis    Cholesterol, total   Date Value Ref Range Status   12/06/2021 157 <200 MG/DL Final     Triglyceride   Date Value Ref Range Status   12/06/2021 85 <150 MG/DL Final     Comment:     The drugs N-acetylcysteine (NAC) and  Metamiszole have been found to cause falsely  low results in this chemical assay. Please  be sure to submit blood samples obtained  BEFORE administration of either of these  drugs to assure correct results. HDL Cholesterol   Date Value Ref Range Status   12/06/2021 49 40 - 60 MG/DL Final     LDL, calculated   Date Value Ref Range Status   12/06/2021 91 0 - 100 MG/DL Final   ]  Key Antihyperlipidemia Meds             atorvastatin (LIPITOR) 80 mg tablet (Taking) TAKE ONE TABLET BY MOUTH DAILY AT NIGHT. INDICATIONS: EXCESSIVE FAT IN THE BLOOD, TREATMENT TO PREVENT A HEART ATTACK           ROS:     ROS  As stated in HPI, otherwise all others negative. The problem list was updated as a part of today's visit. Patient Active Problem List   Diagnosis Code    CAD (coronary artery disease) S52.77    [facility]astolic [facility] ([facility] G98.28    History of non-ST elevation myocardial infarction (NSTEMI) I25.2    Mixed hyperlipidemia E78.2    BMI 45.0-49.9, adult ([facility] Z68.42    Essential hypertension I10       The PSH, FH were reviewed. SH:  Social History     Tobacco Use    Smoking status: Former Smoker     Packs/day: 1.00     Years: 40.00     Pack years: 40.00    Smokeless tobacco: Never Used   Vaping Use    Vaping Use: Never used   Substance Use Topics    Alcohol use: No    Drug use: No       Medications/Allergies:  Current Outpatient Medications on File Prior to Visit   Medication Sig Dispense Refill    methocarbamoL (Robaxin-750) 750 mg tablet Take 1 Tablet by mouth every six (6) hours as needed for Muscle Spasm(s) or Pain. Indications: muscle spasm 20 Tablet 0    losartan (COZAAR) 100 mg tablet Take 1 Tablet by mouth daily.  90 Tablet 0    metoprolol tartrate (LOPRESSOR) 50 mg tablet TAKE ONE TABLET BY MOUTH TWICE A DAY FOR MYOCARDIAL REINFACTION PREVENTION 180 Tablet 0    atorvastatin (LIPITOR) 80 mg tablet TAKE ONE TABLET BY MOUTH DAILY AT NIGHT. INDICATIONS: EXCESSIVE FAT IN THE BLOOD, TREATMENT TO PREVENT A HEART ATTACK 90 Tablet 0    amLODIPine (NORVASC) 5 mg tablet TAKE ONE TABLET BY MOUTH DAILY 90 Tablet 3    hydroCHLOROthiazide (HYDRODIURIL) 25 mg tablet TAKE ONE TABLET BY MOUTH DAILY 90 Tablet 3    aspirin 81 mg chewable tablet Take 1 Tab by mouth daily. 90 Tab 1    [DISCONTINUED] clopidogreL (PLAVIX) 75 mg tab TAKE ONE TABLET BY MOUTH DAILY 90 Tablet 3     No current facility-administered medications on file prior to visit. No Known Allergies    Objective:  Visit Vitals  /82   Pulse 64   Temp 98 °F (36.7 °C) (Temporal)   Resp 20   Ht 5' 5\" (1.651 m)   Wt 273 lb 3.2 oz (123.9 kg)   SpO2 (!) 82%   BMI 45.46 kg/m²    Body mass index is 45.46 kg/m².     Physical assessment  Physical Exam      Labwork and Ancillary Studies:    CBC w/Diff  Lab Results   Component Value Date/Time    WBC 5.5 01/05/2021 01:34 PM    HGB 14.3 01/05/2021 01:34 PM    PLATELET 400 66/91/1555 01:34 PM         Basic Metabolic Profile  Lab Results   Component Value Date/Time    Sodium 142 12/06/2021 01:32 PM    Potassium 4.2 12/06/2021 01:32 PM    Chloride 99 (L) 12/06/2021 01:32 PM    CO2 37 (H) 12/06/2021 01:32 PM    Anion gap 6 12/06/2021 01:32 PM    Glucose 96 12/06/2021 01:32 PM    BUN 27 (H) 12/06/2021 01:32 PM    Creatinine 0.98 12/06/2021 01:32 PM    BUN/Creatinine ratio 28 (H) 12/06/2021 01:32 PM    GFR est AA >60 12/06/2021 01:32 PM    GFR est non-AA 58 (L) 12/06/2021 01:32 PM    Calcium 9.6 12/06/2021 01:32 PM        Cholesterol  Lab Results   Component Value Date/Time    Cholesterol, total 157 12/06/2021 01:32 PM    HDL Cholesterol 49 12/06/2021 01:32 PM    LDL, calculated 91 12/06/2021 01:32 PM    Triglyceride 85 12/06/2021 01:32 PM CHOL/HDL Ratio 3.2 12/06/2021 01:32 PM           I have discussed the diagnosis with the patient and the intended plan as seen in the above orders. The patient has received an After-Visit Summary and questions were answered concerning future plans. An After Visit Summary was printed and given to the patient. All diagnosis have been discussed with the patient and all of the patient's questions have been answered. Follow-up and Dispositions    · Return in about 4 months (around 7/9/2022) for HLD, HTN, 15 min, office only. Tacos Dobbins, [facility]  [address] [id number] [address] [facility] [address]  [id number]

## 2022-03-09 NOTE — PROGRESS NOTES
Room 7    Did patient bring someone? No    Did the patient have DME equipment? No    Did you take your medication today? Yes      1. \"Have you been to the ER, urgent care clinic since your last visit? Hospitalized since your last visit? \" Yes Patient states I went to [facility] for leg cramps. 2. \"Have you seen or consulted any other health care providers outside of the [address] [place] [address] since your last visit? \" No     3. For patients aged 39-70: Has the patient had a colonoscopy / FIT/ Cologuard? No      If the patient is female:    4. For patients aged 41-77: Has the patient had a mammogram within the past 2 years? Yes - no Care Gap present      5. For patients aged 21-65: Has the patient had a pap smear?  Yes - no Care Gap present        3 most recent PHQ Screens 3/9/2022   Little interest or pleasure in doing things Not at all   Feeling down, depressed, irritable, or hopeless Not at all   Total Score PHQ 2 0         Health Maintenance Due   Topic Date Due    Pneumococcal 0-64 years (1 of 2 - PPSV23) Never done    Cervical cancer screen  Never done    Colorectal Cancer Screening Combo  Never done    Shingrix Vaccine Age 50> (1 of 2) Never done    Breast Cancer Screen Mammogram  Never done    Flu Vaccine (1) Never done       Learning Assessment 1/24/2020   PRIMARY LEARNER Patient   HIGHEST LEVEL OF EDUCATION - PRIMARY LEARNER  GRADUATED HIGH SCHOOL OR GED   BARRIERS PRIMARY LEARNER NONE   CO-LEARNER CAREGIVER No   PRIMARY LANGUAGE ENGLISH   LEARNER PREFERENCE PRIMARY LISTENING   ANSWERED BY Shantell Mcbride   RELATIONSHIP SELF

## 2022-03-09 NOTE — PATIENT INSTRUCTIONS
Nighttime Leg Cramps: Care Instructions  Your Care Instructions     Nighttime leg cramps happen when a leg muscle tightens up suddenly. This most often happens in the calf. But cramps in the thigh or foot are also common. Cramps often occur just as you fall asleep or wake up. Leg cramps can be painful. They can last a few seconds to a few minutes. Though they are common, experts don't know exactly what causes them. To treat muscle cramps, you can stretch and massage the muscle. If cramps keep coming back, your doctor may prescribe medicine that relaxes your muscles. Follow-up care is a key part of your treatment and safety. Be sure to make and go to all appointments, and call your doctor if you are having problems. It's also a good idea to know your test results and keep a list of the medicines you take. How can you care for yourself at home? · To stop a leg cramp, sit down and straighten your leg as you bend your foot up toward your knee. It may help to place a rolled towel under the ball of your foot and, while you hold the towel at both ends, gently pull the towel toward you while you keep your knee straight. This stretches the calf muscles. The cramp usually goes away after a few minutes. · Take a warm shower or bath to relax the muscle. Some people find that a heating pad placed on the muscle can also help. Others get relief by rubbing the calf with an ice pack. · Stretch your muscles every day, especially before and after exercise and at bedtime. Regular stretching can relax your muscles and may prevent cramps. · Do not suddenly increase the amount of exercise you get. Increase your exercise a little each week. · If your doctor prescribes medicine, take it exactly as prescribed. Call your doctor if you think you are having a problem with your medicine.   · Ask your doctor if you can take an over-the-counter pain medicine, such as acetaminophen (Tylenol), ibuprofen (Advil, Motrin), or naproxen (Aleve). Be safe with medicines. Read and follow all instructions on the label. · Drink plenty of fluids. If you have kidney, heart, or liver disease and have to limit fluids, talk with your doctor before you increase the amount of fluids you drink. When should you call for help? Watch closely for changes in your health, and be sure to contact your doctor if:    · You often have muscle cramps that do not go away after home treatment.     · Your muscle cramps often wake you up at night.     · You do not get better as expected. Where can you learn more? Go to http://www.gray.com/  Enter S910 in the search box to learn more about \"Nighttime Leg Cramps: Care Instructions. \"  Current as of: December 13, 2021               Content Version: 13.2  © 5103-8700 [employer]. Care instructions adapted under license by [employer] (which disclaims liability or warranty for this information). If you have questions about a medical condition or this instruction, always ask your healthcare professional. [place] [address] any warranty or liability for your use of this information. Low Back Pain: Exercises  Introduction  Here are some examples of exercises for you to try. The exercises may be suggested for a condition or for rehabilitation. Start each exercise slowly. Ease off the exercises if you start to have pain. You will be told when to start these exercises and which ones will work best for you. How to do the exercises  Press-up   1. Lie on your stomach, supporting your body with your forearms. 2. Press your elbows down into the floor to raise your upper back. As you do this, relax your stomach muscles and allow your back to arch without using your back muscles. As your press up, do not let your hips or pelvis come off the floor. 3. Hold for 15 to 30 seconds, then relax. 4. Repeat 2 to 4 times.     Alternate arm and leg (bird dog) exercise   Do this exercise slowly. Try to keep your body straight at all times, and do not let one hip drop lower than the other. 1. Start on the floor, on your hands and knees. 2. Tighten your belly muscles. 3. Raise one leg off the floor, and hold it straight out behind you. Be careful not to let your hip drop down, because that will twist your trunk. 4. Hold for about 6 seconds, then lower your leg and switch to the other leg. 5. Repeat 8 to 12 times on each leg. 6. Over time, work up to holding for 10 to 30 seconds each time. 7. If you feel stable and secure with your leg raised, try raising the opposite arm straight out in front of you at the same time. Knee-to-chest exercise   1. Lie on your back with your knees bent and your feet flat on the floor. 2. Bring one knee to your chest, keeping the other foot flat on the floor (or keeping the other leg straight, whichever feels better on your lower back). 3. Keep your lower back pressed to the floor. Hold for at least 15 to 30 seconds. 4. Relax, and lower the knee to the starting position. 5. Repeat with the other leg. Repeat 2 to 4 times with each leg. 6. To get more stretch, put your other leg flat on the floor while pulling your knee to your chest.    Curl-ups   1. Lie on the floor on your back with your knees bent at a 90-degree angle. Your feet should be flat on the floor, about 12 inches from your buttocks. 2. Cross your arms over your chest. If this bothers your neck, try putting your hands behind your neck (not your head), with your elbows spread apart. 3. Slowly tighten your belly muscles and raise your shoulder blades off the floor. 4. Keep your head in line with your body, and do not press your chin to your chest.  5. Hold this position for 1 or 2 seconds, then slowly lower yourself back down to the floor. 6. Repeat 8 to 12 times. Pelvic tilt exercise   1. Lie on your back with your knees bent. 2. \"Brace\" your stomach.  This means to tighten your muscles by pulling in and imagining your belly button moving toward your spine. You should feel like your back is pressing to the floor and your hips and pelvis are rocking back. 3. Hold for about 6 seconds while you breathe smoothly. 4. Repeat 8 to 12 times. Heel dig bridging   1. Lie on your back with both knees bent and your ankles bent so that only your heels are digging into the floor. Your knees should be bent about 90 degrees. 2. Then push your heels into the floor, squeeze your buttocks, and lift your hips off the floor until your shoulders, hips, and knees are all in a straight line. 3. Hold for about 6 seconds as you continue to breathe normally, and then slowly lower your hips back down to the floor and rest for up to 10 seconds. 4. Do 8 to 12 repetitions. Hamstring stretch in doorway   1. Lie on your back in a doorway, with one leg through the open door. 2. Slide your leg up the wall to straighten your knee. You should feel a gentle stretch down the back of your leg. 3. Hold the stretch for at least 15 to 30 seconds. Do not arch your back, point your toes, or bend either knee. Keep one heel touching the floor and the other heel touching the wall. 4. Repeat with your other leg. 5. Do 2 to 4 times for each leg. Hip flexor stretch   1. Kneel on the floor with one knee bent and one leg behind you. Place your forward knee over your foot. Keep your other knee touching the floor. 2. Slowly push your hips forward until you feel a stretch in the upper thigh of your rear leg. 3. Hold the stretch for at least 15 to 30 seconds. Repeat with your other leg. 4. Do 2 to 4 times on each side. Wall sit   1. Stand with your back 10 to 12 inches away from a wall. 2. Lean into the wall until your back is flat against it. 3. Slowly slide down until your knees are slightly bent, pressing your lower back into the wall. 4. Hold for about 6 seconds, then slide back up the wall. 5. Repeat 8 to 12 times. Follow-up care is a key part of your treatment and safety. Be sure to make and go to all appointments, and call your doctor if you are having problems. It's also a good idea to know your test results and keep a list of the medicines you take. Where can you learn more? Go to http://www.gray.com/  Enter S023 in the search box to learn more about \"Low Back Pain: Exercises. \"  Current as of: March 2, 2020               Content Version: 12.6  © 2006-2020 [employer]. Care instructions adapted under license by [employer] (which disclaims liability or warranty for this information). If you have questions about a medical condition or this instruction, always ask your healthcare professional. [address] any warranty or liability for your use of this information.

## 2022-03-18 PROBLEM — I10 ESSENTIAL HYPERTENSION: Status: ACTIVE | Noted: 2020-08-17

## 2022-03-18 PROBLEM — I50.30 DIASTOLIC CHF (HCC): Status: ACTIVE | Noted: 2020-04-15

## 2022-03-18 PROBLEM — I25.10 CAD (CORONARY ARTERY DISEASE): Status: ACTIVE | Noted: 2020-01-24

## 2022-03-19 PROBLEM — I25.2 HISTORY OF NON-ST ELEVATION MYOCARDIAL INFARCTION (NSTEMI): Status: ACTIVE | Noted: 2020-07-07

## 2022-03-19 PROBLEM — E78.2 MIXED HYPERLIPIDEMIA: Status: ACTIVE | Noted: 2020-07-07

## 2022-03-21 ENCOUNTER — PATIENT MESSAGE (OUTPATIENT)
Dept: FAMILY MEDICINE CLINIC | Age: 62
End: 2022-03-21

## 2022-03-29 ENCOUNTER — TELEPHONE (OUTPATIENT)
Dept: CARDIOLOGY CLINIC | Age: 62
End: 2022-03-29

## 2022-04-18 DIAGNOSIS — I10 UNCONTROLLED HYPERTENSION: ICD-10-CM

## 2022-04-18 DIAGNOSIS — I21.4 NSTEMI (NON-ST ELEVATED MYOCARDIAL INFARCTION) (HCC): ICD-10-CM

## 2022-04-19 RX ORDER — METOPROLOL TARTRATE 50 MG/1
TABLET ORAL
Qty: 180 TABLET | Refills: 0 | Status: SHIPPED | OUTPATIENT
Start: 2022-04-19 | End: 2022-07-19

## 2022-04-19 RX ORDER — ATORVASTATIN CALCIUM 80 MG/1
TABLET, FILM COATED ORAL
Qty: 90 TABLET | Refills: 0 | Status: SHIPPED | OUTPATIENT
Start: 2022-04-19 | End: 2022-07-19

## 2022-04-19 RX ORDER — CLOPIDOGREL BISULFATE 75 MG/1
TABLET ORAL
Qty: 90 TABLET | Refills: 3 | Status: SHIPPED | OUTPATIENT
Start: 2022-04-19 | End: 2022-05-02

## 2022-04-20 ENCOUNTER — PATIENT OUTREACH (OUTPATIENT)
Dept: CASE MANAGEMENT | Age: 62
End: 2022-04-20

## 2022-04-20 NOTE — PROGRESS NOTES
.  Complex Case Management      Date/Time:  2022 4:48 PM    Method of communication with patient:phone    [address] [place] [address] ( [facility]) contacted the patient by telephone to perform Ambulatory Care Coordination. Verified name and  with patient as identifiers. Provided introduction to self, and explanation of the Ambulatory Care Manager's role. Reviewed most recent clinic visit w/ patient who verbalized understanding. Patient given an opportunity to ask questions. Top Challenges reviewed with the patient   1. Continue to complain of right lower leg extremity pains Ankles up to   Knee>>> Was going back to ED Voicing she needs something stronger for this pain>> ED X 3 Last visit Neuropathic pain ( PVL, Neg, X-Ray with [facility] with heel spurs noted) Medication Ibuprofen 600 mg + Flexeril 5 mg q 6 as needed. Continue to complain of pain. ? Maybe EMG ( Neurology),? low dose gabapentin if neurogenic pain   2. Patient requesting an appointment to be seen as soon as possible.>>> ACM reviewed schedule % filled for this week  3. ACM attempted to explain Wellbutrin reason as patient voicing she is not depressed: ACM explained about the smoking secession use and possible  Weight loss when taking it. But all patient voiced that she is not depressed. The patient agrees to contact the PCP office or the [address] [place] [address] for questions related to their healthcare. [facility] provided contact information for future reference. Disease Specific:   N/A    Home Health Active: No    DME Active: No    Barriers to care? level of motivation, medication management    Advance Care Planning:   Does patient have an Advance Directive:  not on file     Medication(s):   Medication reconciliation was performed with patient, who verbalizes understanding of administration of home medications. There were no barriers to obtaining medications identified at this time.     Referral to Pharm D needed: no     Current Outpatient Medications   Medication Sig    metoprolol tartrate (LOPRESSOR) 50 mg tablet TAKE ONE TABLET BY MOUTH TWICE A DAY FOR MYOCARDIAL REINFACTION PREVENTION    atorvastatin (LIPITOR) 80 mg tablet TAKE ONE TABLET BY MOUTH EVERY EVENING FOR EXCESSIVE FAT IN BLOOD, TREATMENT TO PREVENT A HEART ATTACK    cyclobenzaprine (FLEXERIL) 5 mg tablet Take 1 Tablet by mouth three (3) times daily as needed for Muscle Spasm(s).  ibuprofen (MOTRIN) 600 mg tablet Take 1 Tablet by mouth every eight (8) hours as needed for Pain.  lidocaine (Lidoderm) 5 % Apply patch to the affected area for 12 hours a day and remove for 12 hours a day.  losartan (COZAAR) 100 mg tablet Take 1 Tablet by mouth daily.  amLODIPine (NORVASC) 5 mg tablet TAKE ONE TABLET BY MOUTH DAILY    hydroCHLOROthiazide (HYDRODIURIL) 25 mg tablet TAKE ONE TABLET BY MOUTH DAILY    aspirin 81 mg chewable tablet Take 1 Tab by mouth daily.  clopidogreL (PLAVIX) 75 mg tab TAKE ONE TABLET BY MOUTH DAILY (Patient not taking: Reported on 4/20/2022)     No current facility-administered medications for this visit. [facility] follow up appointment(s):   Future Appointments   Date Time Provider Stephy Morgan   7/8/2022 11:30 AM Manuela Espino NP AMA BS AMB   9/8/2022  9:45 AM Kayleen Gaona MD [facility] BS AMB        Non-[facility] follow up appointment(s): n/a    Goals        General     Follows nutrition recommendations and maintains goal weight (small/ frequent meals)      Patient voicing she is gaining weight since she stopped smoking>> ACM said this could be some of her problems as it was noted Plantar and Spurs on right foot X-Rays       Independent self-management skills      Self-schedules and keeps appointments       Patient requesting pain medication stronger than Tylenol ,Motrin 600 mg,Naprosyn 500 mg.ED x 3 in last 4 months.  Takes all medications as ordered      Encourage to take prescribed ED pain medications as ordered.   Continue to voice the medications are not working         Heart Failure     Understand CHF action plan. Echo 55-60% per Dr Tonya Palmer F/U 9/8/2022  Patient voicing weight gain is from her comfort eating since she stopped smoking.   No Edema

## 2022-04-21 NOTE — PROGRESS NOTES
1st attempt calling patient to schedule an appointment for 15 min for leg pain. Patient did not answer left message stating to call the office .

## 2022-04-25 ENCOUNTER — TELEPHONE (OUTPATIENT)
Dept: FAMILY MEDICINE CLINIC | Age: 62
End: 2022-04-25

## 2022-04-25 NOTE — TELEPHONE ENCOUNTER
----- Message from Felecia Muller sent at 4/25/2022  9:44 AM EDT -----  Subject: Message to Provider    QUESTIONS  Information for Provider? Patient is wondering if there is anyway she can   get note to be off work for a couple days because her sciatica nerve pain   is unbearable. Please advise. thanks  ---------------------------------------------------------------------------  --------------  Macario YAO  What is the best way for the office to contact you? OK to leave message on   voicemail  Preferred Call Back Phone Number? [phone]  ---------------------------------------------------------------------------  --------------  SCRIPT ANSWERS  Relationship to Patient?  Self      Please advise

## 2022-05-02 ENCOUNTER — OFFICE VISIT (OUTPATIENT)
Dept: FAMILY MEDICINE CLINIC | Age: 62
End: 2022-05-02
Payer: COMMERCIAL

## 2022-05-02 VITALS
DIASTOLIC BLOOD PRESSURE: 87 MMHG | HEART RATE: 63 BPM | OXYGEN SATURATION: 90 % | TEMPERATURE: 98.1 F | SYSTOLIC BLOOD PRESSURE: 129 MMHG | HEIGHT: 65 IN | WEIGHT: 277.2 LBS | RESPIRATION RATE: 20 BRPM | BODY MASS INDEX: 46.19 KG/M2

## 2022-05-02 DIAGNOSIS — I21.4 NSTEMI (NON-ST ELEVATED MYOCARDIAL INFARCTION) (HCC): ICD-10-CM

## 2022-05-02 DIAGNOSIS — I10 UNCONTROLLED HYPERTENSION: ICD-10-CM

## 2022-05-02 DIAGNOSIS — M54.31 SCIATICA OF RIGHT SIDE: Primary | ICD-10-CM

## 2022-05-02 PROCEDURE — 99214 OFFICE O/P EST MOD 30 MIN: CPT | Performed by: NURSE PRACTITIONER

## 2022-05-02 RX ORDER — CYCLOBENZAPRINE HCL 5 MG
5 TABLET ORAL
Qty: 15 TABLET | Refills: 0 | Status: CANCELLED | OUTPATIENT
Start: 2022-05-02

## 2022-05-02 RX ORDER — BUTALBITAL, ACETAMINOPHEN AND CAFFEINE 50; 325; 40 MG/1; MG/1; MG/1
1 TABLET ORAL
Qty: 30 TABLET | Refills: 0 | Status: SHIPPED | OUTPATIENT
Start: 2022-05-02 | End: 2022-05-17

## 2022-05-02 RX ORDER — AMLODIPINE BESYLATE 5 MG/1
5 TABLET ORAL DAILY
Qty: 90 TABLET | Refills: 3 | Status: SHIPPED | OUTPATIENT
Start: 2022-05-02

## 2022-05-02 RX ORDER — ATORVASTATIN CALCIUM 80 MG/1
TABLET, FILM COATED ORAL
Qty: 90 TABLET | Refills: 0 | Status: CANCELLED | OUTPATIENT
Start: 2022-05-02

## 2022-05-02 RX ORDER — HYDROCHLOROTHIAZIDE 25 MG/1
25 TABLET ORAL DAILY
Qty: 90 TABLET | Refills: 3 | Status: SHIPPED | OUTPATIENT
Start: 2022-05-02

## 2022-05-02 RX ORDER — IBUPROFEN 600 MG/1
600 TABLET ORAL
Qty: 20 TABLET | Refills: 0 | Status: CANCELLED | OUTPATIENT
Start: 2022-05-02

## 2022-05-02 RX ORDER — GUAIFENESIN 100 MG/5ML
81 LIQUID (ML) ORAL DAILY
Qty: 90 TABLET | Refills: 1 | Status: SHIPPED | OUTPATIENT
Start: 2022-05-02

## 2022-05-02 NOTE — PROGRESS NOTES
Room 8     Did patient bring someone? No    Did the patient have DME equipment? No      Did you take your medication today? Yes       1. \"Have you been to the ER, urgent care clinic since your last visit? Hospitalized since your last visit? \" No    2. \"Have you seen or consulted any other health care providers outside of the [address] [place] [address] since your last visit? \" No     3. For patients aged 39-70: Has the patient had a colonoscopy / FIT/ Cologuard? No Patient states they never came and picked up my package . If the patient is female:    4. For patients aged 41-77: Has the patient had a mammogram within the past 2 years? Yes - no Care Gap present      5. For patients aged 21-65: Has the patient had a pap smear?  No        3 most recent PHQ Screens 4/20/2022   Little interest or pleasure in doing things Not at all   Feeling down, depressed, irritable, or hopeless Not at all   Total Score PHQ 2 0         Health Maintenance Due   Topic Date Due    Pneumococcal 0-64 years (1 - PCV) Never done    Cervical cancer screen  Never done    Colorectal Cancer Screening Combo  Never done    Shingrix Vaccine Age 50> (1 of 2) Never done       Learning Assessment 1/24/2020   PRIMARY LEARNER Patient   HIGHEST LEVEL OF EDUCATION - PRIMARY LEARNER  GRADUATED HIGH SCHOOL OR GED   BARRIERS PRIMARY LEARNER NONE   CO-LEARNER CAREGIVER No   PRIMARY LANGUAGE ENGLISH   LEARNER PREFERENCE PRIMARY LISTENING   ANSWERED BY Storm Patton   RELATIONSHIP SELF

## 2022-05-02 NOTE — PROGRESS NOTES
[address] [place] [address]               [phone]      Elbert Yee is a 58 y.o. female and presents with Leg Pain (Patient states right leg pain )       Assessment/Plan:    Diagnoses and all orders for this visit:    1. Sciatica of right side  -     XR SPINE LUMB 2 OR 3 V; Future  -     REFERRAL TO PHYSICAL THERAPY  -     REFERRAL TO PHYSICIAL MEDICINE REHAB  -     butalbital-acetaminophen-caffeine (FIORICET, ESGIC) -40 mg per tablet; Take 1 Tablet by mouth every four (4) hours as needed for Headache. 2. Uncontrolled hypertension  -     amLODIPine (NORVASC) 5 mg tablet; Take 1 Tablet by mouth daily. -     hydroCHLOROthiazide (HYDRODIURIL) 25 mg tablet; Take 1 Tablet by mouth daily. 3. NSTEMI (non-ST elevated myocardial infarction) (HCC)  -     aspirin 81 mg chewable tablet; Take 1 Tablet by mouth daily. suspect her pain is emanating from her back  Refer to PT and spine specialist  X-ray today to check for bony abnormalities  Refills provided    Follow-up and Dispositions    · Return for keep previously scheduled follow up. Health Maintenance:   Health Maintenance   Topic Date Due    Pneumococcal 0-64 years (1 - PCV) Never done    Cervical cancer screen  Never done    Colorectal Cancer Screening Combo  Never done    Shingrix Vaccine Age 50> (1 of 2) Never done    Breast Cancer Screen Mammogram  07/09/2022 (Originally 4/23/2010)    Flu Vaccine (Season Ended) 09/01/2022    Lipid Screen  03/09/2023    Depression Screen  04/20/2023    DTaP/Tdap/Td series (2 - Td or Tdap) 03/30/2025    Hepatitis C Screening  Completed    COVID-19 Vaccine  Completed        Subjective:    Labs obtained prior to visit? NO  Reviewed with patient?  Not applicable    R leg pain  Onset: greater than three months  Characteristics: has been to the ED at least three times and was told it is sciatic  At first the pain was in her ankle and then it started in her hip and goes down her leg  The pain is present first thing in the morning  Treatments tried: ice/hot-helps some, ibuprofen-does not help  Nothing makes the pain completely better  Aggravating: unsure  Has tried exercises: they do not help  In the ED she had PVL's which were negative  Xray of her tib/fib were negative    ROS:     ROS  As stated in HPI, otherwise all others negative. The problem list was updated as a part of today's visit. Patient Active Problem List   Diagnosis Code    CAD (coronary artery disease) K34.16    Diastolic CHF ([facility] [address] U47.66    History of non-ST elevation myocardial infarction (NSTEMI) I25.2    Mixed hyperlipidemia E78.2    BMI 45.0-49.9, adult ([facility] [address] Z68.42    Essential hypertension I10       The PSH, FH were reviewed. SH:  Social History     Tobacco Use    Smoking status: Former Smoker     Packs/day: 1.00     Years: 40.00     Pack years: 40.00    Smokeless tobacco: Never Used   Vaping Use    Vaping Use: Never used   Substance Use Topics    Alcohol use: No    Drug use: No       Medications/Allergies:  Current Outpatient Medications on File Prior to Visit   Medication Sig Dispense Refill    metoprolol tartrate (LOPRESSOR) 50 mg tablet TAKE ONE TABLET BY MOUTH TWICE A DAY FOR MYOCARDIAL REINFACTION PREVENTION 180 Tablet 0    atorvastatin (LIPITOR) 80 mg tablet TAKE ONE TABLET BY MOUTH EVERY EVENING FOR EXCESSIVE FAT IN BLOOD, TREATMENT TO PREVENT A HEART ATTACK 90 Tablet 0    cyclobenzaprine (FLEXERIL) 5 mg tablet Take 1 Tablet by mouth three (3) times daily as needed for Muscle Spasm(s). 15 Tablet 0    ibuprofen (MOTRIN) 600 mg tablet Take 1 Tablet by mouth every eight (8) hours as needed for Pain. 20 Tablet 0    losartan (COZAAR) 100 mg tablet Take 1 Tablet by mouth daily.  90 Tablet 0    [DISCONTINUED] clopidogreL (PLAVIX) 75 mg tab TAKE ONE TABLET BY MOUTH DAILY (Patient not taking: Reported on 4/20/2022) 90 Tablet 3    lidocaine (Lidoderm) 5 % Apply patch to the affected area for 12 hours a day and remove for 12 hours a day. 15 Each 0    [DISCONTINUED] amLODIPine (NORVASC) 5 mg tablet TAKE ONE TABLET BY MOUTH DAILY 90 Tablet 3    [DISCONTINUED] hydroCHLOROthiazide (HYDRODIURIL) 25 mg tablet TAKE ONE TABLET BY MOUTH DAILY 90 Tablet 3    [DISCONTINUED] aspirin 81 mg chewable tablet Take 1 Tab by mouth daily. 90 Tab 1     No current facility-administered medications on file prior to visit. No Known Allergies    Objective:  Visit Vitals  /87 (BP 1 Location: Left arm, BP Patient Position: Sitting, BP Cuff Size: Adult) Comment (BP Patient Position): feet flat on floor   Pulse 63   Temp 98.1 °F (36.7 °C) (Temporal)   Resp 20   Ht 5' 5\" (1.651 m)   Wt 277 lb 3.2 oz (125.7 kg)   SpO2 90%   BMI 46.13 kg/m²    Body mass index is 46.13 kg/m². Physical assessment  Physical Exam  Vitals and nursing note reviewed. Eyes:      Conjunctiva/sclera: Conjunctivae normal.      Pupils: Pupils are equal, round, and reactive to light. Cardiovascular:      Rate and Rhythm: Normal rate and regular rhythm. Heart sounds: Normal heart sounds. No murmur heard. No friction rub. No gallop. Pulmonary:      Effort: Pulmonary effort is normal.      Breath sounds: Normal breath sounds. Musculoskeletal:         General: Normal range of motion. Cervical back: Normal range of motion. Lumbar back: Tenderness present. No swelling, deformity or spasms. Normal range of motion. Back:    Skin:     General: Skin is warm and dry. Neurological:      Mental Status: She is alert.            Labwork and Ancillary Studies:    CBC w/Diff  Lab Results   Component Value Date/Time    WBC 5.5 01/05/2021 01:34 PM    HGB 14.3 01/05/2021 01:34 PM    PLATELET 950 76/41/4412 01:34 PM         Basic Metabolic Profile  Lab Results   Component Value Date/Time    Sodium 142 03/09/2022 11:55 AM    Potassium 3.9 03/09/2022 11:55 AM    Chloride 101 03/09/2022 11:55 AM    CO2 36 (H) 03/09/2022 11:55 AM    Anion gap 5 03/09/2022 11:55 AM    Glucose 113 (H) 03/09/2022 11:55 AM    BUN 25 (H) 03/09/2022 11:55 AM    Creatinine 0.82 03/09/2022 11:55 AM    BUN/Creatinine ratio 30 (H) 03/09/2022 11:55 AM    GFR est AA >60 03/09/2022 11:55 AM    GFR est non-AA >60 03/09/2022 11:55 AM    Calcium 9.6 03/09/2022 11:55 AM        Cholesterol  Lab Results   Component Value Date/Time    Cholesterol, total 159 03/09/2022 11:55 AM    HDL Cholesterol 43 03/09/2022 11:55 AM    LDL, calculated 101.8 (H) 03/09/2022 11:55 AM    Triglyceride 71 03/09/2022 11:55 AM    CHOL/HDL Ratio 3.7 03/09/2022 11:55 AM           I have discussed the diagnosis with the patient and the intended plan as seen in the above orders. The patient has received an After-Visit Summary and questions were answered concerning future plans. An After Visit Summary was printed and given to the patient. All diagnosis have been discussed with the patient and all of the patient's questions have been answered. Follow-up and Dispositions    · Return for keep previously scheduled follow up. Nick Larson, [facility]  [address] [facility]   [address] [facility] [address]  [id number]

## 2022-05-05 ENCOUNTER — PATIENT OUTREACH (OUTPATIENT)
Dept: CASE MANAGEMENT | Age: 62
End: 2022-05-05

## 2022-05-05 DIAGNOSIS — I10 UNCONTROLLED HYPERTENSION: ICD-10-CM

## 2022-05-05 RX ORDER — LOSARTAN POTASSIUM 100 MG/1
100 TABLET ORAL DAILY
Qty: 90 TABLET | Refills: 0 | Status: SHIPPED | OUTPATIENT
Start: 2022-05-05 | End: 2022-07-19

## 2022-05-05 NOTE — PROGRESS NOTES
.Complex Case Management  Follow-Up       Date/Time:   5/5/22  11:54 AM       Ambulatory Care Manager ( ACM) contacted patient for Complex Case Management  follow up. Spoke to patient  Introduced self/role and reason for call. Patient reported: The pain medication said for headache she is not having headaches. Explained to patient that that medication works for other pain than headaches. Patient voiced understanding. Patient voiced she is to see someone a chiropractor. ACM reviewed orders and noted patient has a SELIN appointment also Physical therapy appointment. Patient said she sees PT on 5/20/22. ACM suggested she do knee to chin and leg lifts if possible to help stretch that muscle. Patient said she will do anything to get out of this pain. She has not heard from [facility] yet. ACM said they should be calling soon with an appointment. Pertinent concerns:  Pain, asking can she still take Ibuprofen also. ACM said at least 4 hours apart from the [facility] [place] [address]. Patient voicing understanding. She can use Ice as well on for 10 minutes as needed. Specialist appointments since last outreach? No   If so, specialist and date: N/A    Next PCP Appointment: 7/8/22    Medications:     New medications since last outreach: Hannah Route for pain  Does patient need refills on any medications: No   Medication changes since last outreach (dose adjustments or discontinued meds): No      [address]: Out Patient PT 5/20/22    Barriers to care? None        Patient reminded that there are physicians on call 24 hours a day / 7 days a week (M-F 5pm to 8am and from Friday 5pm until Monday 8a for the weekend) should the patient have questions or concerns.

## 2022-05-17 ENCOUNTER — OFFICE VISIT (OUTPATIENT)
Dept: FAMILY MEDICINE CLINIC | Age: 62
End: 2022-05-17
Payer: COMMERCIAL

## 2022-05-17 ENCOUNTER — PATIENT OUTREACH (OUTPATIENT)
Dept: CASE MANAGEMENT | Age: 62
End: 2022-05-17

## 2022-05-17 VITALS
HEART RATE: 69 BPM | DIASTOLIC BLOOD PRESSURE: 81 MMHG | BODY MASS INDEX: 45.75 KG/M2 | TEMPERATURE: 97.8 F | OXYGEN SATURATION: 100 % | WEIGHT: 274.6 LBS | RESPIRATION RATE: 20 BRPM | SYSTOLIC BLOOD PRESSURE: 119 MMHG | HEIGHT: 65 IN

## 2022-05-17 DIAGNOSIS — M54.31 SCIATICA OF RIGHT SIDE: Primary | ICD-10-CM

## 2022-05-17 PROCEDURE — 99213 OFFICE O/P EST LOW 20 MIN: CPT | Performed by: NURSE PRACTITIONER

## 2022-05-17 RX ORDER — TRAMADOL HYDROCHLORIDE 50 MG/1
50 TABLET ORAL
Qty: 28 TABLET | Refills: 0 | Status: SHIPPED | OUTPATIENT
Start: 2022-05-17 | End: 2022-05-24

## 2022-05-17 NOTE — PROGRESS NOTES
[address] [place] [address]               [phone]      Michelle Dodd is a 58 y.o. female and presents with Leg Pain (Patient states right leg )       Assessment/Plan:    Diagnoses and all orders for this visit:    1. Sciatica of right side  -     traMADoL (ULTRAM) 50 mg tablet; Take 1 Tablet by mouth every six (6) hours as needed for Pain for up to 7 days. Max Daily Amount: 200 mg.    gave her name and number to call spine specialist  Rx for tramadol provided, cautioned on sedating effects  Patient verbalized understanding and is in agreement with this plan of care    Follow-up and Dispositions    · Return for keep previously scheduled follow up. Health Maintenance:   Health Maintenance   Topic Date Due    Pneumococcal 0-64 years (1 - PCV) Never done    Cervical cancer screen  Never done    Colorectal Cancer Screening Combo  Never done    Shingrix Vaccine Age 50> (1 of 2) Never done    Breast Cancer Screen Mammogram  07/09/2022 (Originally 4/23/2010)    Flu Vaccine (Season Ended) 09/01/2022    Lipid Screen  03/09/2023    Depression Screen  04/20/2023    DTaP/Tdap/Td series (2 - Td or Tdap) 03/30/2025    Hepatitis C Screening  Completed    COVID-19 Vaccine  Completed        Subjective:    Labs obtained prior to visit? NO  Reviewed with patient? Not applicable    Leg pain  Is to start PT on 5/20/22  Has not heard from spine MD, Dr. Anika Montoya for appointment. Pain is on the right side  Xray completed 5/9/2022 showed:   IMPRESSION  1. No acute compression deformity. Marked body habitus. 2. Grade 1 anterolisthesis L4 over L5.   The pain is on the right side and goes down her leg through the buttocks and sometimes starts in her ankle and goes up  The pain is a throbbing, aching  She has tried fiorcet, flexeril  She cannot take this medications while working due to sedation  Has used ibuprofen but it is causing stomach upset because she takes it on an empty stomach    ROS:     ROS  As stated in HPI, otherwise all others negative. The problem list was updated as a part of today's visit. Patient Active Problem List   Diagnosis Code    CAD (coronary artery disease) J06.62    Diastolic CHF ([facility] N74.30    History of non-ST elevation myocardial infarction (NSTEMI) I25.2    Mixed hyperlipidemia E78.2    BMI 45.0-49.9, adult ([facility] Z68.42    Essential hypertension I10       The PSH, FH were reviewed. SH:  Social History     Tobacco Use    Smoking status: Former Smoker     Packs/day: 1.00     Years: 40.00     Pack years: 40.00    Smokeless tobacco: Never Used   Vaping Use    Vaping Use: Never used   Substance Use Topics    Alcohol use: No    Drug use: No       Medications/Allergies:  Current Outpatient Medications on File Prior to Visit   Medication Sig Dispense Refill    losartan (COZAAR) 100 mg tablet Take 1 Tablet by mouth daily. 90 Tablet 0    aspirin 81 mg chewable tablet Take 1 Tablet by mouth daily. 90 Tablet 1    amLODIPine (NORVASC) 5 mg tablet Take 1 Tablet by mouth daily. 90 Tablet 3    hydroCHLOROthiazide (HYDRODIURIL) 25 mg tablet Take 1 Tablet by mouth daily. 90 Tablet 3    metoprolol tartrate (LOPRESSOR) 50 mg tablet TAKE ONE TABLET BY MOUTH TWICE A DAY FOR MYOCARDIAL REINFACTION PREVENTION 180 Tablet 0    atorvastatin (LIPITOR) 80 mg tablet TAKE ONE TABLET BY MOUTH EVERY EVENING FOR EXCESSIVE FAT IN BLOOD, TREATMENT TO PREVENT A HEART ATTACK 90 Tablet 0    cyclobenzaprine (FLEXERIL) 5 mg tablet Take 1 Tablet by mouth three (3) times daily as needed for Muscle Spasm(s). 15 Tablet 0    lidocaine (Lidoderm) 5 % Apply patch to the affected area for 12 hours a day and remove for 12 hours a day. 15 Each 0    ibuprofen (MOTRIN) 600 mg tablet Take 1 Tablet by mouth every eight (8) hours as needed for Pain. (Patient not taking: Reported on 5/17/2022) 20 Tablet 0     No current facility-administered medications on file prior to visit. No Known Allergies    Objective:  Visit Vitals  /81 (BP 1 Location: Right arm, BP Patient Position: Sitting, BP Cuff Size: Adult) Comment (BP Patient Position): feet flat on floor   Pulse 69   Temp 97.8 °F (36.6 °C) (Temporal)   Resp 20   Ht 5' 5\" (1.651 m)   Wt 274 lb 9.6 oz (124.6 kg)   SpO2 100%   BMI 45.70 kg/m²    Body mass index is 45.7 kg/m². Physical assessment  Physical Exam  Vitals and nursing note reviewed. Eyes:      Conjunctiva/sclera: Conjunctivae normal.      Pupils: Pupils are equal, round, and reactive to light. Cardiovascular:      Rate and Rhythm: Normal rate and regular rhythm. Heart sounds: Normal heart sounds. No murmur heard. No friction rub. No gallop. Pulmonary:      Effort: Pulmonary effort is normal.      Breath sounds: Normal breath sounds. Musculoskeletal:         General: Normal range of motion. Cervical back: Normal range of motion. Skin:     General: Skin is warm and dry. Neurological:      Mental Status: She is alert.            Labwork and Ancillary Studies:    CBC w/Diff  Lab Results   Component Value Date/Time    WBC 5.5 01/05/2021 01:34 PM    HGB 14.3 01/05/2021 01:34 PM    PLATELET 057 82/97/7605 01:34 PM         Basic Metabolic Profile  Lab Results   Component Value Date/Time    Sodium 142 03/09/2022 11:55 AM    Potassium 3.9 03/09/2022 11:55 AM    Chloride 101 03/09/2022 11:55 AM    CO2 36 (H) 03/09/2022 11:55 AM    Anion gap 5 03/09/2022 11:55 AM    Glucose 113 (H) 03/09/2022 11:55 AM    BUN 25 (H) 03/09/2022 11:55 AM    Creatinine 0.82 03/09/2022 11:55 AM    BUN/Creatinine ratio 30 (H) 03/09/2022 11:55 AM    GFR est AA >60 03/09/2022 11:55 AM    GFR est non-AA >60 03/09/2022 11:55 AM    Calcium 9.6 03/09/2022 11:55 AM        Cholesterol  Lab Results   Component Value Date/Time    Cholesterol, total 159 03/09/2022 11:55 AM    HDL Cholesterol 43 03/09/2022 11:55 AM    LDL, calculated 101.8 (H) 03/09/2022 11:55 AM    Triglyceride 71 03/09/2022 11:55 AM    CHOL/HDL Ratio 3.7 03/09/2022 11:55 AM           I have discussed the diagnosis with the patient and the intended plan as seen in the above orders. The patient has received an After-Visit Summary and questions were answered concerning future plans. An After Visit Summary was printed and given to the patient. All diagnosis have been discussed with the patient and all of the patient's questions have been answered. Follow-up and Dispositions    · Return for keep previously scheduled follow up. Shabnam Romano, TIFFANIE-BC  [address] [facility] [place]   [address] [facility] [address]  [address]

## 2022-05-17 NOTE — LETTER
NOTIFICATION RETURN TO WORK / SCHOOL    5/17/2022 2:17 PM    Ms. Kelsey Arredondo  [address] [id number]      To Whom It May Concern:    Kelsey Arredondo is currently under the care of Radha Awad. She will return to work/school on: 5/21/22. If there are questions or concerns please have the patient contact our office.         Sincerely,      Ashlee Paz NP

## 2022-05-17 NOTE — PROGRESS NOTES
Room 7     Patient states I would like to have medication for weight loss     Did patient bring someone? No    Did the patient have DME equipment? No    Did you take your medication today? Yes       1. \"Have you been to the ER, urgent care clinic since your last visit? Hospitalized since your last visit? \" No    2. \"Have you seen or consulted any other health care providers outside of the [address] [place] [address] since your last visit? \" No     3. For patients aged 39-70: Has the patient had a colonoscopy / FIT/ Cologuard? No Patient states Insurance never came and picked up packaged . Patient states I called and informed them several times and the package was still there. Patient states I just threw it away. If the patient is female:    4. For patients aged 41-77: Has the patient had a mammogram within the past 2 years? Yes - no Care Gap present      5. For patients aged 21-65: Has the patient had a pap smear?  No         3 most recent PHQ Screens 4/20/2022   Little interest or pleasure in doing things Not at all   Feeling down, depressed, irritable, or hopeless Not at all   Total Score PHQ 2 0         Health Maintenance Due   Topic Date Due    Pneumococcal 0-64 years (1 - PCV) Never done    Cervical cancer screen  Never done    Colorectal Cancer Screening Combo  Never done    Shingrix Vaccine Age 50> (1 of 2) Never done       Learning Assessment 1/24/2020   PRIMARY LEARNER Patient   HIGHEST LEVEL OF EDUCATION - PRIMARY LEARNER  GRADUATED HIGH SCHOOL OR GED   BARRIERS PRIMARY LEARNER NONE   CO-LEARNER CAREGIVER No   PRIMARY LANGUAGE ENGLISH   LEARNER PREFERENCE PRIMARY LISTENING   ANSWERED BY Kelley Current   RELATIONSHIP SELF

## 2022-05-17 NOTE — PROGRESS NOTES
.Heart Failure Education outreach Date/Time: 2022 4:15 PM    Ambulatory Care Manager (ACM) contacted the patient by telephone to perform Ambulatory Care Coordination. Verified name and  with patient as identifiers. Provided introduction to self, and explanation of the Ambulatory Care Manager's role. ACM reviewed that a Fresh Start for Spring packet has been sent to [employer]. ACM reviewed CHF zones, daily weights, fluid restriction, the importance of low sodium diet with the patient. Instructed patient to call their PCP & Cardiology if they have a weight gain of 3 lbs in 1 day or 5 lbs in a week. Patient reminded that there is a physician on call 24 hours a day / 7 days a week should the patient have questions or concerns. The patient verbalized understanding.

## 2022-05-20 ENCOUNTER — HOSPITAL ENCOUNTER (OUTPATIENT)
Dept: PHYSICAL THERAPY | Age: 62
Discharge: HOME | End: 2022-05-20
Payer: COMMERCIAL

## 2022-05-20 PROCEDURE — 97110 THERAPEUTIC EXERCISES: CPT

## 2022-05-20 PROCEDURE — 97162 PT EVAL MOD COMPLEX 30 MIN: CPT

## 2022-05-20 NOTE — PROGRESS NOTES
[address] [facility] AT [place] [address], [place], [address]  Phone: [phone]   [phone]  PLAN OF CARE / [address] [place] [address] PHYSICAL THERAPY SERVICES  Patient Name: Nadir Foley : 1960   Medical   iagnosis: Sciatica of R side [M54.31] Treatment Diagnosis: Sciatica, right side [M54.31]   Onset Date: 2022     Referral Source: Francisco Calderon NP Start of Care [facility]): 2022   Prior Hospitalization: See medical history Provider #: [id number]   Prior Level of Function: Previously able to tolerate standing/walking without LE pain; caregiver for 79 y/o   Comorbidities: HTN; MI 3/2020; BMI 45.6   Medications: Verified on Patient Summary List   The Plan of Care and following information is based on the information from the initial evaluation.   ===========================================================================================  Assessment / key information: Pt is a 57 y/o F who presents to PT w/ c/o R leg pain, that has been present for 3 months insidiously. Pt notes pain ranges 6 to 10/10, made worse with standing/walking, as day goes on; better with Tramadol, sitting, bending, laying SL with pillow between knees. Describes pain as throbbing, located posterior thigh and lateral lower leg; denies sx into the foot. Testing/imaging has included x-ray of lower leg (negative for fx) and x-ray of back (Gr 1 L4/5 anterolisthesis). Prior treatment has included nothing to date; chart review shows referral to [address] [place] [address] however pt notes she has not been contacted yet; pt was provided with phone number to [address] [place] [address] today. Red flags negative. FOTO 42/100, indicating 58% functional impairment. Clinical Exam Findings:  POSTURE/OBSERVATION: B genu-valgum, pes planus, standing with lumbar hyperextension.    FUNCTIONAL ASSESSMENT: sit<>stand requires B UE support; heel walk/toe walk painful; squat partial range; bed mobility ind with rolling, poor mechanics supine<>sit, req min A for SL to sit; bridge poor, unable to clear hips fro table; TA isometric poor  ROM: lumbar AROM flex 90% , ext 10% p!, R SB to mid thigh p!, L SB to distal thigh, RR 25% p!, hip ER (short sit) R 22 p!/L 21, hip IR (short sit) R 18/L 19, hip flex R 104/L 105, hip ext R 3/L 5 (measured PROM in SL)  STRENGTH:  MMT RIGHT LEFT   Hip flex 3+/5 3+/5   Hip abd 3-/5 3/5   Hip ER 3-/5 3-/5   Hip IR 3-/5 3-/5   Hip ext 2/5 2/5   Knee flex 4/5 4+/5   Knee ext 4/5 4+/5   Ankle DF 4+/5 4+/5   Ankle PF 2+/5 2+/5   Ankle EV 4/5 4/5   PALPATION: Sensation symmetrical and intact to light touch B LE dermatomes. TTP to R piriformis, glute max, glute med. SPECIAL TEST: (-) slump, (-) SLR, (+) quadrant test R, (-)90/90 HS. Pt demo flexion directional preference     Pt presents w/ sx suggesting/consistent with lumbar DDD. Pt could benefit from PT to address impairments and functional limitations in order to improve pt's ability to complete ADLs.  ===========================================================================================  Eval Complexity: History MEDIUM  Complexity : 1-2 comorbidities / personal factors will impact the outcome/ POC ;  Examination  MEDIUM Complexity : 3 Standardized tests and measures addressing body structure, function, activity limitation and / or participation in recreation ; Presentation MEDIUM Complexity : Evolving with changing characteristics ;   Decision Making MEDIUM Complexity : FOTO score of 26-74; Overall Complexity MEDIUM  Problem List: pain affecting function, decrease ROM, decrease strength, impaired gait/ balance, decrease ADL/ functional abilitiies, decrease activity tolerance, decrease flexibility/ joint mobility and decrease transfer abilities   Treatment Plan may include any combination of the following: Therapeutic exercise, Therapeutic activities, Neuromuscular re-education, Physical agent/modality, Gait/balance training, Manual therapy, Patient education, Self Care training, Functional mobility training, Home safety training and Stair training  Patient / Family readiness to learn indicated by: asking questions, trying to perform skills and interest  Persons(s) to be included in education: patient (P)  Barriers to Learning/Limitations:yes; financial  Measures taken, if barriers to learning: High copay will limited ability to attend PT regularly; plan to progress HEP each visit. Patient Goal (s): \"no pain\"   Patient self reported health status: fair  Rehabilitation Potential: fair   Short Term Goals: To be accomplished in  1  weeks:  1. Pt will be I and compliant with HEP for self management of sx. 2. Pt will demo ability to isomerically engage TA w/o compensation or valsalva in order to progress core stability exercises   Long Term Goals: To be accomplished in  4  weeks:  1. Pt will show improved glute max strength as evident by ability to perform supine bridge to >75% for inc ease with transfers  2. Pt will note ability to stand/walk at least 10 minutes w/o radicular sx in order to improve functional mobility  3. Improve FOTO score to >/= 62/100 to indicate improved function    Frequency / Duration:   Patient to be seen  1  times per week for 4  weeks: All LTG as above will be assessed and updated every 10 visits or 30 days and progressed as needed    Patient / Caregiver education and instruction: exercises and other transfer training  Therapist Signature: Junie Cho PT Date: 5/44/8230   Certification Period: na Time: 8:30 AM   ===========================================================================================  I certify that the above Physical Therapy Services are being furnished while the patient is under my care. I agree with the treatment plan and certify that this therapy is necessary.     Physician Signature:        Date:       Time:                         Jes Vasquez NP  Please sign and return to [facility] GER[facility]SYCH UNIT or you may fax the signed copy to [phone]. Thank you.

## 2022-05-20 NOTE — PROGRESS NOTES
PHYSICAL THERAPY - DAILY TREATMENT NOTE    Patient Name: Demetrius Govea        Date: 2022  : 1960   yes Patient  Verified  Visit #:   1   of   4  Insurance: Payor: Sophia Magdaleno / Plan: [facility] PPO / Product Type: PPO /      In time: 10:53 Out time: 11:40   Total Treatment Time: 47     Medicare/[facility] Time Tracking (below)   Total Timed Codes (min):  10 1:1 Treatment Time:  47     TREATMENT AREA =  Sciatica, right side [M54.31]    SUBJECTIVE  Pain Level (on 0 to 10 scale):  6  / 10   Medication Changes/New allergies or changes in medical history, any new surgeries or procedures?    no  If yes, update Summary List   Subjective Functional Status/Changes:  []  No changes reported     See POC          OBJECTIVE  10 min Therapeutic Exercise:  [x]  See flow sheet   Rationale:      increase ROM and increase strength to improve the patients ability to complete ADLs, transfers     Billed With/As:   [x] TE   [] TA   [] Neuro   [] Self Care Patient Education: [x] Review HEP    [] Progressed/Changed HEP based on:   [] positioning   [x] body mechanics   [x] transfers   [] heat/ice application    [] other:      Other Objective/Functional Measures:    See POC     Post Treatment Pain Level (on 0 to 10) scale:    10     ASSESSMENT  Assessment/Changes in Function:     See POC     []  See Progress Note/Recertification   Patient will continue to benefit from skilled PT services to modify and progress therapeutic interventions, address functional mobility deficits, address ROM deficits, address strength deficits, analyze and address soft tissue restrictions, analyze and cue movement patterns, analyze and modify body mechanics/ergonomics, assess and modify postural abnormalities and instruct in home and community integration to attain remaining goals.    Progress toward goals / Updated goals:    See POC     PLAN  []  Upgrade activities as tolerated yes Continue plan of care   []  Discharge due to :    [] Other:      Therapist: Kristen Taylor, PT    Date: 5/20/2022 Time: 8:30 AM     Future Appointments   Date Time Provider Stephy Morgan   5/20/2022 10:45 AM Nelson Foster, SIM [facility]   7/22/2022  9:30 AM Chyna Calle NP [facility] BS AMB   9/8/2022  9:45 AM Lauren Bruno MD [facility] BS AMB

## 2022-06-03 ENCOUNTER — APPOINTMENT (OUTPATIENT)
Dept: PHYSICAL THERAPY | Age: 62
End: 2022-06-03

## 2022-06-06 ENCOUNTER — TELEPHONE (OUTPATIENT)
Dept: FAMILY MEDICINE CLINIC | Age: 62
End: 2022-06-06

## 2022-06-06 DIAGNOSIS — M54.31 SCIATICA OF RIGHT SIDE: Primary | ICD-10-CM

## 2022-06-06 NOTE — TELEPHONE ENCOUNTER
Patient called again requesting pain medication and would like a call back as to what she can do for the pain.

## 2022-06-07 ENCOUNTER — PATIENT OUTREACH (OUTPATIENT)
Dept: CASE MANAGEMENT | Age: 62
End: 2022-06-07

## 2022-06-08 RX ORDER — TRAMADOL HYDROCHLORIDE 50 MG/1
50 TABLET ORAL
Qty: 28 TABLET | Refills: 0 | Status: SHIPPED | OUTPATIENT
Start: 2022-06-08 | End: 2022-06-15

## 2022-06-08 RX ORDER — CYCLOBENZAPRINE HCL 5 MG
5 TABLET ORAL
Qty: 15 TABLET | Refills: 0 | Status: SHIPPED | OUTPATIENT
Start: 2022-06-08 | End: 2022-06-27

## 2022-06-08 NOTE — TELEPHONE ENCOUNTER
Informed patient that Tramadol is at pharmacy and this is the last time KELLEY.WILLIE Marino will prescribe Tramadol. Patient states I understand and I have an appointment with the Spine specialist on 6/28/22 at 1: 00 PM . Patient verbalized understanding.

## 2022-06-08 NOTE — TELEPHONE ENCOUNTER
Patient  is requesting for more pain medication . Patient states my leg is hurting really bad and I have an appointment with the spine specialist.  A refill has been placed for Flexeril.

## 2022-06-13 NOTE — PROGRESS NOTES
.Complex Case Management  Follow-Up       Date/Time:   6/7/22  3:50 PM       Ambulatory Care Manager ( ACM) contacted patient for Complex Case Management  follow up. Left call back number for return call     Noted : No ED or hospital admissions        Specialist /ED visited since last outreach? No   If so, specialist / ED and date: N/A    Next PCP Appointment: 7/8/22    Medications:     New medications since last outreach: No  Medication changes since last outreach (dose adjustments or discontinued meds): No         Patient reminded that there are physicians on call 24 hours a day / 7 days a week . Call Ligia Irwin. [address] [place] [address] [phone] Mon-Fri 8:00-4:30.

## 2022-06-14 ENCOUNTER — PATIENT OUTREACH (OUTPATIENT)
Dept: CASE MANAGEMENT | Age: 62
End: 2022-06-14

## 2022-06-14 NOTE — PROGRESS NOTES
.Complex Case Management  Follow-Up       Patient: Krystina Lubin discussed during interdisciplinary rounds 6/14/2022 to optimize plan of care. Patient with a past medical history of   Past Medical History:   Diagnosis Date    Diastolic dysfunction     Hypertension     Obesity    . In attendance Gian Sweeney NP, Samuel Lozada RN, ACM. Recommendations from the team:Follow up with therapies and ortho appointment, Ambulatory  will continue to follow. Samuel Lozada RN          Date/Time:   6/14/22  12:50       Ambulatory Care Manager ( ACM) contacted patient for Complex Case Management  follow up. Spoke to patient  Introduced self/role and reason for call. Patient reported:   Tramadol is helping . She has an appointment with ortho 6/27 but patient said she wrote it down and it should be 6/28. She will call them tomorrow to clarify. Pertinent concerns:  Physical therapy co-pay is $50.00 per secession and she can not afford that she has to live. They tried to schedule her twice a week. She will go to see the ortho doctor another co-pay but she can not afford both       Specialist appointments since last outreach? no   If so, specialist and date: n/a    Next PCP Appointment: 7/22/22    Medications:     New medications since last outreach: Yes  Does patient need refills on any medications: Tramadol  Medication changes since last outreach (dose adjustments or discontinued meds): No      Home Health company: Out patient physical therapy    Barriers to care? Co-payment is $50.00 per secession she can't afford going twice a week. What they are showing her with the ball she can do those exercises. She is going 1 more time and then go to the ortho doctor. Patient reminded that there are physicians on call 24 hours a day / 7 days a week (M-F 5pm to 8am and from Friday 5pm until Monday 8a for the weekend) should the patient have questions or concerns.

## 2022-06-24 ENCOUNTER — APPOINTMENT (OUTPATIENT)
Dept: PHYSICAL THERAPY | Age: 62
End: 2022-06-24

## 2022-06-27 ENCOUNTER — OFFICE VISIT (OUTPATIENT)
Dept: ORTHOPEDIC SURGERY | Age: 62
End: 2022-06-27
Payer: COMMERCIAL

## 2022-06-27 VITALS
OXYGEN SATURATION: 92 % | HEART RATE: 66 BPM | HEIGHT: 65 IN | BODY MASS INDEX: 44.98 KG/M2 | WEIGHT: 270 LBS | TEMPERATURE: 97.3 F

## 2022-06-27 DIAGNOSIS — M54.41 ACUTE RIGHT-SIDED LOW BACK PAIN WITH RIGHT-SIDED SCIATICA: Primary | ICD-10-CM

## 2022-06-27 PROCEDURE — 99204 OFFICE O/P NEW MOD 45 MIN: CPT | Performed by: PHYSICAL MEDICINE & REHABILITATION

## 2022-06-27 RX ORDER — TRAMADOL HYDROCHLORIDE 50 MG/1
50 TABLET ORAL
COMMUNITY
End: 2022-06-27 | Stop reason: ALTCHOICE

## 2022-06-27 RX ORDER — TRAMADOL HYDROCHLORIDE 50 MG/1
50 TABLET ORAL
Qty: 28 TABLET | Refills: 0 | Status: SHIPPED | OUTPATIENT
Start: 2022-06-27 | End: 2022-07-04

## 2022-06-27 NOTE — PROGRESS NOTES
Travon Fall presents today for   Chief Complaint   Patient presents with    Leg Pain     right    Back Pain       Is someone accompanying this pt? no    Is the patient using any DME equipment during OV? no    Depression Screening:  3 most recent PHQ Screens 4/20/2022   Little interest or pleasure in doing things Not at all   Feeling down, depressed, irritable, or hopeless Not at all   Total Score PHQ 2 0       Learning Assessment:  Learning Assessment 1/24/2020   PRIMARY LEARNER Patient   HIGHEST LEVEL OF EDUCATION - PRIMARY LEARNER  GRADUATED HIGH SCHOOL OR GED   BARRIERS PRIMARY LEARNER NONE   CO-LEARNER CAREGIVER No   PRIMARY LANGUAGE ENGLISH   LEARNER PREFERENCE PRIMARY LISTENING   ANSWERED BY Lalo Carpio   RELATIONSHIP SELF       Abuse Screening:  Abuse Screening Questionnaire 11/11/2020   Do you ever feel afraid of your partner? N   Are you in a relationship with someone who physically or mentally threatens you? N   Is it safe for you to go home? Y       Fall Risk  Fall Risk Assessment, last 12 mths 11/11/2020   Able to walk? Yes   Fall in past 12 months? No   Number of falls in past 12 months -       Coordination of Care:  1. Have you been to the ER, urgent care clinic since your last visit? no  Hospitalized since your last visit? no    2. Have you seen or consulted any other health care providers outside of the [address] [place] [address] since your last visit? Yes, pcp and physical therapy Include any pap smears or colon screening.  no

## 2022-06-27 NOTE — PATIENT INSTRUCTIONS
Low Back Pain: Exercises  Introduction  Here are some examples of exercises for you to try. The exercises may be suggested for a condition or for rehabilitation. Start each exercise slowly. Ease off the exercises if you start to have pain. You will be told when to start these exercises and which ones will work best for you. How to do the exercises  Press-up    1. Lie on your stomach, supporting your body with your forearms. 2. Press your elbows down into the floor to raise your upper back. As you do this, relax your stomach muscles and allow your back to arch without using your back muscles. As your press up, do not let your hips or pelvis come off the floor. 3. Hold for 15 to 30 seconds, then relax. 4. Repeat 2 to 4 times. Alternate arm and leg (bird dog) exercise    Do this exercise slowly. Try to keep your body straight at all times, and do not let one hip drop lower than the other. 1. Start on the floor, on your hands and knees. 2. Tighten your belly muscles. 3. Raise one leg off the floor, and hold it straight out behind you. Be careful not to let your hip drop down, because that will twist your trunk. 4. Hold for about 6 seconds, then lower your leg and switch to the other leg. 5. Repeat 8 to 12 times on each leg. 6. Over time, work up to holding for 10 to 30 seconds each time. 7. If you feel stable and secure with your leg raised, try raising the opposite arm straight out in front of you at the same time. Knee-to-chest exercise    1. Lie on your back with your knees bent and your feet flat on the floor. 2. Bring one knee to your chest, keeping the other foot flat on the floor (or keeping the other leg straight, whichever feels better on your lower back). 3. Keep your lower back pressed to the floor. Hold for at least 15 to 30 seconds. 4. Relax, and lower the knee to the starting position. 5. Repeat with the other leg. Repeat 2 to 4 times with each leg.   6. To get more stretch, put your other leg flat on the floor while pulling your knee to your chest.  Curl-ups    1. Lie on the floor on your back with your knees bent at a 90-degree angle. Your feet should be flat on the floor, about 12 inches from your buttocks. 2. Cross your arms over your chest. If this bothers your neck, try putting your hands behind your neck (not your head), with your elbows spread apart. 3. Slowly tighten your belly muscles and raise your shoulder blades off the floor. 4. Keep your head in line with your body, and do not press your chin to your chest.  5. Hold this position for 1 or 2 seconds, then slowly lower yourself back down to the floor. 6. Repeat 8 to 12 times. Pelvic tilt exercise    1. Lie on your back with your knees bent. 2. \"Brace\" your stomach. This means to tighten your muscles by pulling in and imagining your belly button moving toward your spine. You should feel like your back is pressing to the floor and your hips and pelvis are rocking back. 3. Hold for about 6 seconds while you breathe smoothly. 4. Repeat 8 to 12 times. Heel dig bridging    1. Lie on your back with both knees bent and your ankles bent so that only your heels are digging into the floor. Your knees should be bent about 90 degrees. 2. Then push your heels into the floor, squeeze your buttocks, and lift your hips off the floor until your shoulders, hips, and knees are all in a straight line. 3. Hold for about 6 seconds as you continue to breathe normally, and then slowly lower your hips back down to the floor and rest for up to 10 seconds. 4. Do 8 to 12 repetitions. Hamstring stretch in doorway    1. Lie on your back in a doorway, with one leg through the open door. 2. Slide your leg up the wall to straighten your knee. You should feel a gentle stretch down the back of your leg. 3. Hold the stretch for at least 15 to 30 seconds. Do not arch your back, point your toes, or bend either knee.  Keep one heel touching the floor and the other heel touching the wall. 4. Repeat with your other leg. 5. Do 2 to 4 times for each leg. Hip flexor stretch    1. Kneel on the floor with one knee bent and one leg behind you. Place your forward knee over your foot. Keep your other knee touching the floor. 2. Slowly push your hips forward until you feel a stretch in the upper thigh of your rear leg. 3. Hold the stretch for at least 15 to 30 seconds. Repeat with your other leg. 4. Do 2 to 4 times on each side. Wall sit    1. Stand with your back 10 to 12 inches away from a wall. 2. Lean into the wall until your back is flat against it. 3. Slowly slide down until your knees are slightly bent, pressing your lower back into the wall. 4. Hold for about 6 seconds, then slide back up the wall. 5. Repeat 8 to 12 times. Follow-up care is a key part of your treatment and safety. Be sure to make and go to all appointments, and call your doctor if you are having problems. It's also a good idea to know your test results and keep a list of the medicines you take. Where can you learn more? Go to http://www.gray.com/  Enter T378 in the search box to learn more about \"Low Back Pain: Exercises. \"  Current as of: July 1, 2021               Content Version: 13.2  © 2006-2022 Healthwise[employer] Inc[employer]. Care instructions adapted under license by [employer] (which disclaims liability or warranty for this information). If you have questions about a medical condition or this instruction, always ask your healthcare professional. [place] [address] any warranty or liability for your use of this information. Acute Low Back Pain: Exercises  Introduction  Here are some examples of typical rehabilitation exercises for your condition. Start each exercise slowly. Ease off the exercise if you start to have pain.   Your doctor or physical therapist will tell you when you can start these exercises and which ones will work best for you. When you are not being active, find a comfortable position for rest. Some people are comfortable on the floor or a medium-firm bed with a small pillow under their head and another under their knees. Some people prefer to lie on their side with a pillow between their knees. Don't stay in one position for too long. Take short walks (10 to 20 minutes) every 2 to 3 hours. Avoid slopes, hills, and stairs until you feel better. Walk only distances you can manage without pain, especially leg pain. How to do the exercises  Back stretches    1. Get down on your hands and knees on the floor. 2. Relax your head and allow it to droop. Round your back up toward the ceiling until you feel a nice stretch in your upper, middle, and lower back. Hold this stretch for as long as it feels comfortable, or about 15 to 30 seconds. 3. Return to the starting position with a flat back while you are on your hands and knees. 4. Let your back sway by pressing your stomach toward the floor. Lift your buttocks toward the ceiling. 5. Hold this position for 15 to 30 seconds. 6. Repeat 2 to 4 times. Follow-up care is a key part of your treatment and safety. Be sure to make and go to all appointments, and call your doctor if you are having problems. It's also a good idea to know your test results and keep a list of the medicines you take. Where can you learn more? Go to http://www.gray.com/  Enter Z071 in the search box to learn more about \"Acute Low Back Pain: Exercises. \"  Current as of: September 8, 2021               Content Version: 13.2  © 2006-2022 [employer]. Care instructions adapted under license by [employer] (which disclaims liability or warranty for this information).  If you have questions about a medical condition or this instruction, always ask your healthcare professional. [address] any warranty or liability for your use of this information.

## 2022-06-27 NOTE — PROGRESS NOTES
[address]  [address], [address]  [place], [address] [place] [address] Street  Phone: [phone]  Fax: [phone]      Patient: Darius Webster                                                                              MRN: [id number]        YOB: 1960          AGE: 58 y.o. PCP: Luis Deluca NP  Date:  06/27/22    Reason for Consultation: Leg Pain (right) and Back Pain      HPI:  Darius Webster is a 58 y.o. female with relevant PMH of HTN,CAD who presents with low back pain radiating down the right leg. The pain began around February 2022. Denies any precipitating incident or trauma. She had and x-ray of her lumbar spine which demonstrated L4/5 anterolisthesis. She has triedtried muscle relaxants, tylenol, ibuprofen and tramadol. She does get some relief with tramadol. She tried PT but it cost 50$ a week , she has continued with her home exercises. She works as a CNA. Neurologic symptoms: No numbness, tingling, weakness, bowel or bladder changes. No recent falls      Location: The pain is located in the low back   Radiation: The pain does radiate down the right leg posteriorly towards the foot. Pain Score: Currently: 8/10    Quality: Pain is of a Achy and Burning quality. Aggravating: Pain is exacerbated by walking and standing  Alleviating: The pain is alleviated by sitting    Prior Treatments:  Physical therapy: YES- tried but cost 50$ per week   Injections:NO    Previous Medications: flexeril  Current Medications: tylenol arthritis , tramadol twice daily   Previous work-up has included:   XR Results (most recent):  Results from Appointment encounter on 05/02/22    XR SPINE LUMB 2 OR 3 V    Narrative  HISTORY: Lumbar spine pain. Exam: Lumbar spine. Technique: 3 views lumbar spine were obtained. No prior studies for comparison. FINDINGS: Grade 1 anterolisthesis L4 over L5. 5 lumbar vertebra's. No acute  fracture. . Marked body habitus. Impression  1. No acute compression deformity. Marked body habitus. 2. Grade 1 anterolisthesis L4 over L5. Past Medical History:   Past Medical History:   Diagnosis Date    Diastolic dysfunction     Hypertension     Obesity       Past Surgical History:   Past Surgical History:   Procedure Laterality Date    HX CORONARY STENT PLACEMENT        SocHx:   Social History     Tobacco Use    Smoking status: Former Smoker     Packs/day: 1.00     Years: 40.00     Pack years: 40.00    Smokeless tobacco: Never Used   Substance Use Topics    Alcohol use: No      FamHx:? Family History   Problem Relation Age of Onset    Hypertension Mother     Stroke Mother     No Known Problems Father        Current Medications:    Current Outpatient Medications   Medication Sig Dispense Refill    traMADoL (ULTRAM) 50 mg tablet Take 50 mg by mouth two (2) times daily as needed for Pain.  losartan (COZAAR) 100 mg tablet Take 1 Tablet by mouth daily. 90 Tablet 0    aspirin 81 mg chewable tablet Take 1 Tablet by mouth daily. 90 Tablet 1    amLODIPine (NORVASC) 5 mg tablet Take 1 Tablet by mouth daily. 90 Tablet 3    hydroCHLOROthiazide (HYDRODIURIL) 25 mg tablet Take 1 Tablet by mouth daily. 90 Tablet 3    metoprolol tartrate (LOPRESSOR) 50 mg tablet TAKE ONE TABLET BY MOUTH TWICE A DAY FOR MYOCARDIAL REINFACTION PREVENTION 180 Tablet 0    atorvastatin (LIPITOR) 80 mg tablet TAKE ONE TABLET BY MOUTH EVERY EVENING FOR EXCESSIVE FAT IN BLOOD, TREATMENT TO PREVENT A HEART ATTACK 90 Tablet 0    ibuprofen (MOTRIN) 600 mg tablet Take 1 Tablet by mouth every eight (8) hours as needed for Pain. 20 Tablet 0    cyclobenzaprine (FLEXERIL) 5 mg tablet Take 1 Tablet by mouth three (3) times daily as needed for Muscle Spasm(s). 15 Tablet 0    lidocaine (Lidoderm) 5 % Apply patch to the affected area for 12 hours a day and remove for 12 hours a day.  15 Each 0      Allergies:  No Known Allergies     Review of Systems:   Gen:    Denied fevers, chills, malaise, fatigue, weight changes   Resp: Denied shortness of breath, cough, wheezing   CVS: Denied chest pain, palpitations   : Denied urinary urgency, frequency, incontinence   GI: Denied nausea, vomiting, constipation, diarrhea   Skin: Denied rashes, wounds   Psych: Denied anxiety, depression   Vasc: Denied claudication, ulcers   Hem: Denied easy bruising/bleeding   MSK: See HPI   Neuro: See HPI         Physical Exam     Vital Signs:   Visit Vitals  Pulse 66   Temp 97.3 °F (36.3 °C) (Temporal)   Ht 5' 5\" (1.651 m)   Wt 270 lb (122.5 kg)   SpO2 92% Comment: RA   BMI 44.93 kg/m²      General: ??????? Well nourished and well developed female without any acute distress   Psychiatric: ?  Alert and oriented x 3 with normal mood    HEENT: ???????? Atraumatic   Respiratory:   Breathing non-labored and non dyspneic   CV: ???????????????? Peripheral pulses intact, no peripheral edema   Skin: ????????????? No rashes       Neurologic: ?? Sensation: normal and grossly intact thebilateral, lower extremity(s)   Strength: 5/5 in the bilateral, lower extremity(s)   Reflexes: reveals 2+ symmetric DTRs throughout   Gait: eduardo    Musculoskeletal: Lumbar Exam     Inspection:   Alignment: Normal  Atrophy: None     Tenderness to Palpation:   Lumbar paraspinals Positive  Lumbar spinous processes Negative  SI Joint:  Negative  Gluteal:Positive right    Greater trochanter: Negative      ROM:   Lumbar ROM: Abnormal pain with flexion/extension  Lumbar facet loading: Negative  Hip ROM: No reproduction of pain with movement     Special Tests      Slump test: Positive right   SLR: Positive right   LORENA: Positive low back   FADIR: Negative  Log Roll: Negative      Medical Decision Making:    Images: The imaging results as well as the actual images of the studies below were reviewed, visualized and interpreted by me. Labs: The results below were reviewed.    X-ray lumbar spine 5/2/2022  Grade 1 anterolisthesis L4/5     Assessment:   - right lumbar radiculopathy    Plan:      -Physical therapy -  Discussed PT, cost prohibitive. Added new exercises and stretches. Discussed activities to avoid  -Medications - will renew tramadol as it has helped. She is going to try to take 1 per day as needed. Explained it is not a great long term pain medications. Would consider switching to lyrica or Neurontin. Counseled regarding side effects and appropriate administration of medications. PDMP checked    -Diagnostics/Imaging - x-ray lumbar spine reviewed  -Injections -Consider SIMEON- she is not interested in injections at this point   -Lifestyle -encouraged weight loss   -Education - The patient's diagnosis, prognosis and treatment options were discussed today. All questions were answered. F/U - 4 weeks. If pain has not improved will get MRI lumbar spine for possible SIMEON. Consider switching tramadol to lyrica or gabapentin.             [address] [facility]

## 2022-07-05 ENCOUNTER — PATIENT OUTREACH (OUTPATIENT)
Dept: CASE MANAGEMENT | Age: 62
End: 2022-07-05

## 2022-07-05 NOTE — PROGRESS NOTES
.Heart Failure Education outreach Date/Time: 2022 2:40 PM    Ambulatory Care Manager (ACM) contacted the patient by telephone to perform Ambulatory Care Coordination. Verified name and  with patient as identifiers. Provided introduction to self, and explanation of the Ambulatory Care Manager's role. ACM reviewed that a Health Healthy tips packet for the Holiday has been sent to [employer]. ACM reviewed CHF zones, daily weights, fluid restriction, the importance of low sodium diet, healthy tips packet with the patient. Instructed patient to call their Cardiologist if they have a weight gain of 3 lbs in 2 days or 5 lbs in a week. Patient reminded that there is a physician on call 24 hours a day / 7 days a week should the patient have questions or concerns. The patient verbalized understanding.

## 2022-07-05 NOTE — PROGRESS NOTES
[address] [facility] AT [address], [address]  Phone: [phone]   [phone]    DISCHARGE NOTE  Patient Name: Darius Webster : 1960   Treatment/Medical Diagnosis: Sciatica, right side [M54.31]   Referral Source: Luis Deluca NP     Date of Initial Visit: 22 Attended Visits: 1 Missed Visits: 3       SUMMARY OF TREATMENT  Pt attended initial evaluation and no follow-up appointments. Unfortunately, patient failed to return for further treatment and is therefore discharged from our care at this time. Pt had multiple attendance violations including not showing up for 3 follow up visits in a row after initial evaluation. Pt reported that she \"was fine with being discharged from therapy\" when contacted about discharging. A formal reassessment of goals was unable to be performed due to unplanned DC. RECOMMENDATIONS  Discontinue physical therapy due to patient not returning. If you have any questions/comments please contact us directly at [phone]. Thank you for allowing us to assist in the care of your patient.     Therapist Signature: Prema Scott PTA Date: 22    Armando Cedeño PT, DPT, CMTPT Time: 2:45 PM

## 2022-07-22 ENCOUNTER — HOSPITAL ENCOUNTER (OUTPATIENT)
Dept: LAB | Age: 62
Discharge: HOME | End: 2022-07-22
Payer: COMMERCIAL

## 2022-07-22 ENCOUNTER — OFFICE VISIT (OUTPATIENT)
Dept: FAMILY MEDICINE CLINIC | Age: 62
End: 2022-07-22
Payer: COMMERCIAL

## 2022-07-22 DIAGNOSIS — E78.2 MIXED HYPERLIPIDEMIA: ICD-10-CM

## 2022-07-22 DIAGNOSIS — I10 ESSENTIAL HYPERTENSION: Primary | ICD-10-CM

## 2022-07-22 DIAGNOSIS — Z12.31 ENCOUNTER FOR SCREENING MAMMOGRAM FOR MALIGNANT NEOPLASM OF BREAST: ICD-10-CM

## 2022-07-22 DIAGNOSIS — Z12.11 COLON CANCER SCREENING: ICD-10-CM

## 2022-07-22 DIAGNOSIS — I10 ESSENTIAL HYPERTENSION: ICD-10-CM

## 2022-07-22 LAB
ALBUMIN SERPL-MCNC: 3.4 G/DL (ref 3.4–5)
ALBUMIN/GLOB SERPL: 0.9 {RATIO} (ref 0.8–1.7)
ALP SERPL-CCNC: 62 U/L (ref 45–117)
ALT SERPL-CCNC: 23 U/L (ref 13–56)
ANION GAP SERPL CALC-SCNC: 5 MMOL/L (ref 3–18)
AST SERPL-CCNC: 14 U/L (ref 10–38)
BILIRUB SERPL-MCNC: 0.6 MG/DL (ref 0.2–1)
BUN SERPL-MCNC: 21 MG/DL (ref 7–18)
BUN/CREAT SERPL: 27 (ref 12–20)
CALCIUM SERPL-MCNC: 9.3 MG/DL (ref 8.5–10.1)
CHLORIDE SERPL-SCNC: 101 MMOL/L (ref 100–111)
CHOLEST SERPL-MCNC: 153 MG/DL
CO2 SERPL-SCNC: 37 MMOL/L (ref 21–32)
CREAT SERPL-MCNC: 0.79 MG/DL (ref 0.6–1.3)
GLOBULIN SER CALC-MCNC: 4 G/DL (ref 2–4)
GLUCOSE SERPL-MCNC: 120 MG/DL (ref 74–99)
HDLC SERPL-MCNC: 42 MG/DL (ref 40–60)
HDLC SERPL: 3.6 {RATIO} (ref 0–5)
LDLC SERPL CALC-MCNC: 95 MG/DL (ref 0–100)
LIPID PROFILE,FLP: NORMAL
POTASSIUM SERPL-SCNC: 4.1 MMOL/L (ref 3.5–5.5)
PROT SERPL-MCNC: 7.4 G/DL (ref 6.4–8.2)
SODIUM SERPL-SCNC: 143 MMOL/L (ref 136–145)
TRIGL SERPL-MCNC: 80 MG/DL (ref ?–150)
VLDLC SERPL CALC-MCNC: 16 MG/DL

## 2022-07-22 PROCEDURE — 99214 OFFICE O/P EST MOD 30 MIN: CPT | Performed by: NURSE PRACTITIONER

## 2022-07-22 PROCEDURE — 80053 COMPREHEN METABOLIC PANEL: CPT

## 2022-07-22 PROCEDURE — 80061 LIPID PANEL: CPT

## 2022-07-22 PROCEDURE — 36415 COLL VENOUS BLD VENIPUNCTURE: CPT

## 2022-07-22 RX ORDER — PHENTERMINE HYDROCHLORIDE 37.5 MG/1
37.5 TABLET ORAL
Qty: 30 TABLET | Refills: 2 | Status: SHIPPED | OUTPATIENT
Start: 2022-07-22

## 2022-07-22 NOTE — PROGRESS NOTES
Room 7    When asked if patient has any concerns he would like to address with EULALIA Carpenter patient states yes, I would lke to discuss weight loss . Did patient bring someone? No    Did the patient have DME equipment? No     Did you take your medication today? Yes       1. \"Have you been to the ER, urgent care clinic since your last visit? Hospitalized since your last visit? \" No    2. \"Have you seen or consulted any other health care providers outside of the [address] [place] [address] since your last visit? \" No     3. For patients aged 39-70: Has the patient had a colonoscopy / FIT/ Cologuard? No      If the patient is female:    4. For patients aged 41-77: Has the patient had a mammogram within the past 2 years? No      5. For patients aged 21-65: Has the patient had a pap smear?  No Patient will schedule appointment for pap        3 most recent PHQ Screens 7/22/2022   Little interest or pleasure in doing things Not at all   Feeling down, depressed, irritable, or hopeless Not at all   Total Score PHQ 2 0         Health Maintenance Due   Topic Date Due    Pneumococcal 0-64 years (1 - PCV) Never done    Cervical cancer screen  Never done    Colorectal Cancer Screening Combo  Never done    Shingrix Vaccine Age 50> (1 of 2) Never done    Breast Cancer Screen Mammogram  Never done    COVID-19 Vaccine (4 - Booster for Saab Peter series) 04/23/2022       Learning Assessment 1/24/2020   PRIMARY LEARNER Patient   HIGHEST LEVEL OF EDUCATION - PRIMARY LEARNER  GRADUATED HIGH SCHOOL OR GED   BARRIERS PRIMARY LEARNER NONE   CO-LEARNER CAREGIVER No   PRIMARY LANGUAGE ENGLISH   LEARNER PREFERENCE PRIMARY LISTENING   ANSWERED BY Ching Magallon   RELATIONSHIP SELF

## 2022-07-22 NOTE — PROGRESS NOTES
[address] [place] [address]               [phone]      Julio Johnson is a 58 y.o. female and presents with Follow Up Chronic Condition, Cholesterol Problem, and Hypertension       Assessment/Plan:    Diagnoses and all orders for this visit:    1. Essential hypertension  -     METABOLIC PANEL, COMPREHENSIVE; Future  -     METABOLIC PANEL, COMPREHENSIVE; Future  Endorses medication compliance, Follow up labs prior to next visit, labs today and Blood pressure uncontrolled continue metoprolol, losartan, amlodipin and hctz, she wishes to work on weight loss at this time. Recheck at next visit  2. Mixed hyperlipidemia  -     LIPID PANEL; Future  -     LIPID PANEL; Future  Endorses medication compliance, Follow up labs prior to next visit, Follow-up labs today, and Denies abdominal pain or symptoms of myalgia     3. BMI 45.0-49.9, adult (HCC)  -     phentermine (ADIPEX-P) 37.5 mg tablet; Take 1 Tablet by mouth every morning. Max Daily Amount: 37.5 mg.  Discussed not eating fast food and the amt of calories, fat and sodium in them  Exercise: walks about 1-1.5 hours three times a week  Recommend cut out fast food, continue with exercise, start phentermine, will follow up in three months    4. Encounter for screening mammogram for malignant neoplasm of breast  -     YESI MAMMO BI SCREENING INCL CAD; Future  Health maintenance needs addressed  5. Colon cancer screening  -     COLOGUARD TEST (FECAL DNA COLORECTAL CANCER SCREENING)  Health maintenance needs addressed    Follow-up and Dispositions    Return in about 3 months (around 10/22/2022) for HTN, HLD, weight loss, 15 min, office only, w/labs prior.            Health Maintenance:   Health Maintenance   Topic Date Due    Pneumococcal 0-64 years (1 - PCV) Never done    Cervical cancer screen  Never done    Colorectal Cancer Screening Combo  Never done    Shingrix Vaccine Age 50> (1 of 2) Never done    Breast Cancer Screen Mammogram Never done    COVID-19 Vaccine (4 - Booster for Pfizer series) 2022    Flu Vaccine (1) 2022    Depression Screen  2023    Lipid Screen  2023    DTaP/Tdap/Td series (2 - Td or Tdap) 2025    Hepatitis C Screening  Completed        Subjective:    Labs obtained prior to visit? NO  Reviewed with patient? Not applicable    Hypertension:  Visit Vitals  BP (!) 146/94 (BP 1 Location: Right arm, BP Patient Position: Sitting, BP Cuff Size: Adult) Comment (BP Patient Position): feet flat on floor   Pulse 60   Temp 98.2 °F (36.8 °C) (Temporal)   Resp 18   Ht 5' 5\" (1.651 m)   Wt 271 lb (122.9 kg)   SpO2 92%   BMI 45.10 kg/m²      Patient reports taking medications as instructed. yes   Medication side effects noted. no  Headache upon wakening. no   Home BP monitorin/71  Do you experience chest pain/pressure or SOB with exertion? no  Maintain a low Sodium diet? yes  Key CAD CHF Meds               metoprolol tartrate (LOPRESSOR) 50 mg tablet (Taking) TAKE ONE TABLET BY MOUTH TWICE A DAY FOR MYOCARDIAL REINFACTION PREVENTION    atorvastatin (LIPITOR) 80 mg tablet (Taking) TAKE ONE TABLET BY MOUTH EVERY EVENING FOR EXCESSIVE FAT IN BLOOD, TREATMENT TO PREVENT A HEART ATTACK    losartan (COZAAR) 100 mg tablet (Taking) TAKE ONE TABLET BY MOUTH DAILY    aspirin 81 mg chewable tablet (Taking) Take 1 Tablet by mouth daily. amLODIPine (NORVASC) 5 mg tablet (Taking) Take 1 Tablet by mouth daily. hydroCHLOROthiazide (HYDRODIURIL) 25 mg tablet (Taking) Take 1 Tablet by mouth daily. BUN   Date Value Ref Range Status   2022 21 (H) 7.0 - 18 MG/DL Final     Creatinine   Date Value Ref Range Status   2022 0.79 0.6 - 1.3 MG/DL Final     GFR est AA   Date Value Ref Range Status   2022 >60 >60 ml/min/1.73m2 Final     Potassium   Date Value Ref Range Status   2022 4.1 3.5 - 5.5 mmol/L Final         HLD:  Has been compliant with meds  Yes  Compliant with low-fat diet.   most of the time    Denies myalgias or other side effects. yes  The ASCVD Risk score (Jackie Rodriguez, et al., 2013) failed to calculate for the following reasons: The patient has a prior MI or stroke diagnosis    Cholesterol, total   Date Value Ref Range Status   07/22/2022 153 <200 MG/DL Final     Triglyceride   Date Value Ref Range Status   07/22/2022 80 <150 MG/DL Final     Comment:     The drugs N-acetylcysteine (NAC) and  Metamiszole have been found to cause falsely  low results in this chemical assay. Please  be sure to submit blood samples obtained  BEFORE administration of either of these  drugs to assure correct results. HDL Cholesterol   Date Value Ref Range Status   07/22/2022 42 40 - 60 MG/DL Final     LDL, calculated   Date Value Ref Range Status   07/22/2022 95 0 - 100 MG/DL Final   ]  Key Antihyperlipidemia Meds               atorvastatin (LIPITOR) 80 mg tablet (Taking) TAKE ONE TABLET BY MOUTH EVERY EVENING FOR EXCESSIVE FAT IN BLOOD, TREATMENT TO PREVENT A HEART ATTACK           Obesity  Interventions tried  She has cut out bread, stopped soda's  Eating more salad  States not drinking a lot of water  Admits to cheating at times with chips and a soda every now and again  [facility] fast foods and snacks a lot-eats candy   Discussed not eating fast food and the amt of calories, fat and sodium in them  Exercise: walks about 1-1.5 hours three times a week  Recommend cut out fast food, continue with exercise, start phentermine, will follow up in three months    ROS:     ROS  As stated in HPI, otherwise all others negative. The problem list was updated as a part of today's visit. Patient Active Problem List   Diagnosis Code    CAD (coronary artery disease) V64.70    Diastolic CHF (HCC) I31.59    History of non-ST elevation myocardial infarction (NSTEMI) I25.2    Mixed hyperlipidemia E78.2    BMI 45.0-49.9, adult ([facility] Z68.42    Essential hypertension I10       The PSH, FH were reviewed. SH:  Social History     Tobacco Use    Smoking status: Former     Packs/day: 1.00     Years: 40.00     Pack years: 40.00     Types: Cigarettes    Smokeless tobacco: Never   Vaping Use    Vaping Use: Never used   Substance Use Topics    Alcohol use: No    Drug use: No       Medications/Allergies:  Current Outpatient Medications on File Prior to Visit   Medication Sig Dispense Refill    metoprolol tartrate (LOPRESSOR) 50 mg tablet TAKE ONE TABLET BY MOUTH TWICE A DAY FOR MYOCARDIAL REINFACTION PREVENTION 180 Tablet 3    atorvastatin (LIPITOR) 80 mg tablet TAKE ONE TABLET BY MOUTH EVERY EVENING FOR EXCESSIVE FAT IN BLOOD, TREATMENT TO PREVENT A HEART ATTACK 90 Tablet 3    losartan (COZAAR) 100 mg tablet TAKE ONE TABLET BY MOUTH DAILY 90 Tablet 3    aspirin 81 mg chewable tablet Take 1 Tablet by mouth daily. 90 Tablet 1    amLODIPine (NORVASC) 5 mg tablet Take 1 Tablet by mouth daily. 90 Tablet 3    hydroCHLOROthiazide (HYDRODIURIL) 25 mg tablet Take 1 Tablet by mouth daily. 90 Tablet 3    ibuprofen (MOTRIN) 600 mg tablet Take 1 Tablet by mouth every eight (8) hours as needed for Pain. 20 Tablet 0    lidocaine (Lidoderm) 5 % Apply patch to the affected area for 12 hours a day and remove for 12 hours a day. (Patient not taking: No sig reported) 15 Each 0     No current facility-administered medications on file prior to visit. No Known Allergies    Objective:  Visit Vitals  BP (!) 146/94 (BP 1 Location: Right arm, BP Patient Position: Sitting, BP Cuff Size: Adult) Comment (BP Patient Position): feet flat on floor   Pulse 60   Temp 98.2 °F (36.8 °C) (Temporal)   Resp 18   Ht 5' 5\" (1.651 m)   Wt 271 lb (122.9 kg)   SpO2 92%   BMI 45.10 kg/m²    Body mass index is 45.1 kg/m². Physical assessment  Physical Exam  Vitals and nursing note reviewed. Eyes:      Conjunctiva/sclera: Conjunctivae normal.      Pupils: Pupils are equal, round, and reactive to light.    Cardiovascular:      Rate and Rhythm: Normal rate and regular rhythm. Heart sounds: Normal heart sounds. No murmur heard. No friction rub. No gallop. Pulmonary:      Effort: Pulmonary effort is normal.      Breath sounds: Normal breath sounds. Musculoskeletal:         General: Normal range of motion. Cervical back: Normal range of motion. Skin:     General: Skin is warm and dry. Neurological:      Mental Status: She is alert. Labwork and Ancillary Studies:    CBC w/Diff  Lab Results   Component Value Date/Time    WBC 5.5 01/05/2021 01:34 PM    HGB 14.3 01/05/2021 01:34 PM    PLATELET 391 96/30/5314 01:34 PM         Basic Metabolic Profile  Lab Results   Component Value Date/Time    Sodium 143 07/22/2022 10:23 AM    Potassium 4.1 07/22/2022 10:23 AM    Chloride 101 07/22/2022 10:23 AM    CO2 37 (H) 07/22/2022 10:23 AM    Anion gap 5 07/22/2022 10:23 AM    Glucose 120 (H) 07/22/2022 10:23 AM    BUN 21 (H) 07/22/2022 10:23 AM    Creatinine 0.79 07/22/2022 10:23 AM    BUN/Creatinine ratio 27 (H) 07/22/2022 10:23 AM    GFR est AA >60 07/22/2022 10:23 AM    GFR est non-AA >60 07/22/2022 10:23 AM    Calcium 9.3 07/22/2022 10:23 AM        Cholesterol  Lab Results   Component Value Date/Time    Cholesterol, total 153 07/22/2022 10:23 AM    HDL Cholesterol 42 07/22/2022 10:23 AM    LDL, calculated 95 07/22/2022 10:23 AM    Triglyceride 80 07/22/2022 10:23 AM    CHOL/HDL Ratio 3.6 07/22/2022 10:23 AM           I have discussed the diagnosis with the patient and the intended plan as seen in the above orders. The patient has received an After-Visit Summary and questions were answered concerning future plans. An After Visit Summary was printed and given to the patient. All diagnosis have been discussed with the patient and all of the patient's questions have been answered. Follow-up and Dispositions    Return in about 3 months (around 10/22/2022) for HTN, HLD, weight loss, 15 min, office only, w/labs prior.            Andrea Goel AGNP-BC  [address] [facility]   [address] [facility] [address] [facility] [place].  [id number]

## 2022-07-27 ENCOUNTER — OFFICE VISIT (OUTPATIENT)
Dept: ORTHOPEDIC SURGERY | Age: 62
End: 2022-07-27
Payer: COMMERCIAL

## 2022-07-27 DIAGNOSIS — M54.41 ACUTE RIGHT-SIDED LOW BACK PAIN WITH RIGHT-SIDED SCIATICA: Primary | ICD-10-CM

## 2022-07-27 PROCEDURE — 99214 OFFICE O/P EST MOD 30 MIN: CPT | Performed by: PHYSICAL MEDICINE & REHABILITATION

## 2022-07-27 RX ORDER — GABAPENTIN 300 MG/1
300 CAPSULE ORAL
Qty: 30 CAPSULE | Refills: 0 | Status: SHIPPED
Start: 2022-07-27 | End: 2022-08-24 | Stop reason: ALTCHOICE

## 2022-07-27 NOTE — PROGRESS NOTES
[address]  [address], [address]  [address]  Phone: [phone]  Fax: [phone]      Patient: Fransisca Aarngo                                                                              MRN: [id number]        YOB: 1960          AGE: 58 y.o. PCP: Jennifer Ragsdale NP  Date:  07/27/22    Reason for Consultation: Back Pain (Lumbar Right Sided/)      HPI:  Fransisca Arango is a 58 y.o. female with relevant PMH of HTN,CAD who presented with low back pain radiating down the right leg. The pain began around February 2022. Denies any precipitating incident or trauma. She had and x-ray of her lumbar spine which demonstrated L4/5 anterolisthesis. She has tried muscle relaxants, tylenol, ibuprofen and tramadol. She does get some relief with tramadol. She tried PT but it cost 50$ a week , she has continued with her home exercises. Overall she had been doing well until earlier this week when pain returned   She works as a CNA. Neurologic symptoms: No numbness, tingling, weakness, bowel or bladder changes. No recent falls      Location: The pain is located in the low back   Radiation: The pain does radiate down the right leg posteriorly towards the foot. Pain Score: Currently: 8/10    Quality: Pain is of a Achy and Burning quality. Aggravating: Pain is exacerbated by walking and standing  Alleviating: The pain is alleviated by sitting    Prior Treatments:  Physical therapy: YES- tried but cost 50$ per week   Injections:NO    Previous Medications: flexeril  Current Medications: tylenol arthritis , tramadol twice daily   Previous work-up has included:   XR Results (most recent):  Results from Appointment encounter on 05/02/22    XR SPINE LUMB 2 OR 3 V    Narrative  HISTORY: Lumbar spine pain. Exam: Lumbar spine. Technique: 3 views lumbar spine were obtained. No prior studies for comparison.     FINDINGS: Grade 1 anterolisthesis L4 over L5. 5 lumbar vertebra's. No acute  fracture. . Marked body habitus. Impression  1. No acute compression deformity. Marked body habitus. 2. Grade 1 anterolisthesis L4 over L5. Past Medical History:   Past Medical History:   Diagnosis Date    Diastolic dysfunction     Hypertension     Obesity       Past Surgical History:   Past Surgical History:   Procedure Laterality Date    HX CORONARY STENT PLACEMENT        SocHx:   Social History     Tobacco Use    Smoking status: Former     Packs/day: 1.00     Years: 40.00     Pack years: 40.00     Types: Cigarettes    Smokeless tobacco: Never   Substance Use Topics    Alcohol use: No      FamHx:? Family History   Problem Relation Age of Onset    Hypertension Mother     Stroke Mother     No Known Problems Father        Current Medications:    Current Outpatient Medications   Medication Sig Dispense Refill    phentermine (ADIPEX-P) 37.5 mg tablet Take 1 Tablet by mouth every morning. Max Daily Amount: 37.5 mg. 30 Tablet 2    metoprolol tartrate (LOPRESSOR) 50 mg tablet TAKE ONE TABLET BY MOUTH TWICE A DAY FOR MYOCARDIAL REINFACTION PREVENTION 180 Tablet 3    atorvastatin (LIPITOR) 80 mg tablet TAKE ONE TABLET BY MOUTH EVERY EVENING FOR EXCESSIVE FAT IN BLOOD, TREATMENT TO PREVENT A HEART ATTACK 90 Tablet 3    losartan (COZAAR) 100 mg tablet TAKE ONE TABLET BY MOUTH DAILY 90 Tablet 3    aspirin 81 mg chewable tablet Take 1 Tablet by mouth daily. 90 Tablet 1    amLODIPine (NORVASC) 5 mg tablet Take 1 Tablet by mouth daily. 90 Tablet 3    hydroCHLOROthiazide (HYDRODIURIL) 25 mg tablet Take 1 Tablet by mouth daily. 90 Tablet 3    ibuprofen (MOTRIN) 600 mg tablet Take 1 Tablet by mouth every eight (8) hours as needed for Pain. 20 Tablet 0    lidocaine (Lidoderm) 5 % Apply patch to the affected area for 12 hours a day and remove for 12 hours a day.  (Patient not taking: No sig reported) 15 Each 0      Allergies:  No Known Allergies     Review of Systems: Gen:    Denied fevers, chills, malaise, fatigue, weight changes   Resp: Denied shortness of breath, cough, wheezing   CVS: Denied chest pain, palpitations   : Denied urinary urgency, frequency, incontinence   GI: Denied nausea, vomiting, constipation, diarrhea   Skin: Denied rashes, wounds   Psych: Denied anxiety, depression   Vasc: Denied claudication, ulcers   Hem: Denied easy bruising/bleeding   MSK: See HPI   Neuro: See HPI         Physical Exam     Vital Signs: There were no vitals taken for this visit. General: ??????? Well nourished and well developed female without any acute distress   Psychiatric: ?  Alert and oriented x 3 with normal mood    HEENT: ???????? Atraumatic   Respiratory:   Breathing non-labored and non dyspneic   CV: ???????????????? Peripheral pulses intact, no peripheral edema   Skin: ????????????? No rashes       Neurologic: ?? Sensation: normal and grossly intact thebilateral, lower extremity(s)   Strength: 5/5 in the bilateral, lower extremity(s)   Reflexes: reveals 2+ symmetric DTRs throughout   Gait: eduardo    Musculoskeletal: Lumbar Exam     Inspection:   Alignment: Normal  Atrophy: None     Tenderness to Palpation:   Lumbar paraspinals Positive  Lumbar spinous processes Negative  SI Joint:  Negative  Gluteal:Positive right    Greater trochanter: Negative      ROM:   Lumbar ROM: Abnormal pain with flexion/extension  Lumbar facet loading: Negative  Hip ROM: No reproduction of pain with movement     Special Tests      Slump test: Positive right   SLR: Positive right   LORENA: Positive low back   FADIR: Negative  Log Roll: Negative      Medical Decision Making:    Images: The imaging results as well as the actual images of the studies below were reviewed, visualized and interpreted by me. Labs: The results below were reviewed.    X-ray lumbar spine 5/2/2022  Grade 1 anterolisthesis L4/5     Assessment:   - right lumbar radiculopathy    Plan:      -Physical therapy - Discussed PT, cost prohibitive. Added new exercises and stretches. Discussed activities to avoid  -Medications -Start gabapentin 300mg qhs and can increase to 300mg TID as tolerated. Reviewed potential side effects including but not limited to: drowsiness, dizziness, feeling in a fog, mood changes, fluid retention.   - will try gabapentin in place of tramadol. PDMP checked    -Diagnostics/Imaging - x-ray lumbar spine reviewed  -Injections -Consider SIMEON- she is not interested in injections at this point   -Lifestyle -encouraged weight loss   -Education - The patient's diagnosis, prognosis and treatment options were discussed today. All questions were answered. F/U - 4 weeks. If pain has not improved will get MRI lumbar spine for possible SIMEON.   Adjust dosage of gabapentin as needed           Anna-Christina Merlinda Mouton MD [phone]

## 2022-07-29 VITALS
WEIGHT: 271 LBS | HEART RATE: 60 BPM | SYSTOLIC BLOOD PRESSURE: 142 MMHG | RESPIRATION RATE: 18 BRPM | HEIGHT: 65 IN | BODY MASS INDEX: 45.15 KG/M2 | TEMPERATURE: 98.2 F | DIASTOLIC BLOOD PRESSURE: 88 MMHG | OXYGEN SATURATION: 92 %

## 2022-08-08 ENCOUNTER — PATIENT OUTREACH (OUTPATIENT)
Dept: CASE MANAGEMENT | Age: 62
End: 2022-08-08

## 2022-08-08 NOTE — PROGRESS NOTES
.Complex Case Management      Date/Time:  2022 1:49 PM    Method of communication with patient:phone    [address] [facility] ([facility]) contacted the patient by telephone to perform Ambulatory Care Coordination. Verified name and  (PHI) with patient as identifiers. Provided introduction to self, and explanation of the Ambulatory Care Manager's role. Reviewed most recent clinic visit w/ patient who verbalized understanding. Patient given an opportunity to ask questions. Top Challenges reviewed with the Provider   Patient tolerating hs 300 mg gabapentin well started 22 by ortho  Phentermine is being tolerating well as well. Patient is ambulating around school track 5 times, 2.5 miles each with her son 3 days weekly. Blood pressure 138/72 this am..     The patient agrees to contact the PCP office or the [facility] for questions related to their healthcare. Provided contact information for future reference. Disease Specific:   N/A    Home Health Active: No    DME Active: No    Barriers to care? None    Advance Care Planning:   Does patient have an Advance Directive:  not on file; education provided     Medication(s):   Medication reconciliation was performed with patient, who verbalizes understanding of administration of home medications. There were no barriers to obtaining medications identified at this time. Referral to Pharm D needed: no     Current Outpatient Medications   Medication Sig    gabapentin (NEURONTIN) 300 mg capsule Take 1 Capsule by mouth nightly for 30 days. Max Daily Amount: 300 mg.    phentermine (ADIPEX-P) 37.5 mg tablet Take 1 Tablet by mouth every morning.  Max Daily Amount: 37.5 mg.    metoprolol tartrate (LOPRESSOR) 50 mg tablet TAKE ONE TABLET BY MOUTH TWICE A DAY FOR MYOCARDIAL REINFACTION PREVENTION    atorvastatin (LIPITOR) 80 mg tablet TAKE ONE TABLET BY MOUTH EVERY EVENING FOR EXCESSIVE FAT IN BLOOD, TREATMENT TO PREVENT A HEART ATTACK    losartan (COZAAR) 100 mg tablet TAKE ONE TABLET BY MOUTH DAILY    aspirin 81 mg chewable tablet Take 1 Tablet by mouth daily. amLODIPine (NORVASC) 5 mg tablet Take 1 Tablet by mouth daily. hydroCHLOROthiazide (HYDRODIURIL) 25 mg tablet Take 1 Tablet by mouth daily. ibuprofen (MOTRIN) 600 mg tablet Take 1 Tablet by mouth every eight (8) hours as needed for Pain.    lidocaine (Lidoderm) 5 % Apply patch to the affected area for 12 hours a day and remove for 12 hours a day. (Patient not taking: No sig reported)     No current facility-administered medications for this visit. [facility] follow up appointment(s):   Future Appointments   Date Time Provider Stephy Morgan   8/24/2022  4:00 PM Katya Rincon MD S BS AMB   9/8/2022  9:45 AM Orly Salter MD [facility] BS AMB   10/17/2022  9:30 AM AMA LAB AMA BS AMB   10/26/2022 10:00 AM Chucho Chino NP AMA BS AMB        Non-[facility] follow up appointment(s): No   Goals Addressed                   This Visit's Progress       General     Follows nutrition recommendations and maintains goal weight (small/ frequent meals)   On track     Patient voicing she is gaining weight since she stopped smoking>> ACM said this could be some of her problems as it was noted Plantar and Spurs on right foot X-Rays  8/8/22  Started Phentermine 7/22/22  Ambulating with son around school track 5 times 2.5 miles 3 days a week before going to work. Not snacking at bedtime since start of medications. Independent self-management skills   On track     8/8/22  Taking blood pressure daily today 138/72  Exercise by walking track 3 x weekly. Self-schedules and keeps appointments    On track     Patient requesting pain medication stronger than Tylenol ,Motrin 600 mg,Naprosyn 500 mg.ED x 3 in last 4 months. 6/14/22  IDR meeting patient has been prescribed Tramadol for low back and hip pains.  Patient voicing it is helping  8/8/22  Ortho placed on Gabapentin 300 mg every bedtime. Voicing it seems to be helping. COMPLETED: Takes all medications as ordered   On track     Encourage to take prescribed ED pain medications as ordered. Continue to voice the medications are not working  6/14/22  PCP placed on Tramadol for pain as well as PT  8/8/22  Gabapentin 300 mg at bedtime.

## 2022-08-24 ENCOUNTER — VIRTUAL VISIT (OUTPATIENT)
Dept: ORTHOPEDIC SURGERY | Age: 62
End: 2022-08-24
Payer: COMMERCIAL

## 2022-08-24 DIAGNOSIS — M54.41 ACUTE RIGHT-SIDED LOW BACK PAIN WITH RIGHT-SIDED SCIATICA: Primary | ICD-10-CM

## 2022-08-24 DIAGNOSIS — M54.41 ACUTE RIGHT-SIDED LOW BACK PAIN WITH RIGHT-SIDED SCIATICA: ICD-10-CM

## 2022-08-24 PROCEDURE — 99442 PR PHYS/QHP TELEPHONE EVALUATION 11-20 MIN: CPT | Performed by: PHYSICAL MEDICINE & REHABILITATION

## 2022-08-24 RX ORDER — GABAPENTIN 300 MG/1
300 CAPSULE ORAL 2 TIMES DAILY
Qty: 60 CAPSULE | Refills: 0 | Status: SHIPPED | OUTPATIENT
Start: 2022-08-24 | End: 2022-09-26

## 2022-08-24 RX ORDER — TRAMADOL HYDROCHLORIDE 50 MG/1
50 TABLET ORAL
Qty: 10 TABLET | Refills: 0 | Status: SHIPPED | OUTPATIENT
Start: 2022-08-24 | End: 2022-09-03

## 2022-08-24 NOTE — PROGRESS NOTES
[address]  [address], [address]  [facility], [address]  Phone: [phone]  Fax: [phone]      Patient: Berenice Darling                                                                              MRN: [id number]        YOB: 1960          AGE: 58 y.o. PCP: Marcelo Ferrell NP  Date:  08/24/22    Reason for Consultation: Back Pain      HPI:  Berenice Darling is a 58 y.o. female with relevant PMH of HTN,CAD who presented with low back pain radiating down the right leg. The pain began around February 2022. Denies any precipitating incident or trauma. She had and x-ray of her lumbar spine which demonstrated L4/5 anterolisthesis. She has tried muscle relaxants, tylenol, ibuprofen and tramadol. She does get good relief with tramadol. She tried PT but it cost 50$ a week , she has continued with her home exercises. Overall she had been doing well  Since her last visit she has been taking gabapentin 300mg once daily which she thinks is helping. She works as a CNA. Neurologic symptoms: No numbness, tingling, weakness, bowel or bladder changes. No recent falls      Location: The pain is located in the low back (90%)  Radiation: The pain does radiate down the right leg posteriorly towards the foot- none recently  Pain Score: Currently: 6/10    Quality: Pain is of a Achy and Burning quality. Aggravating: Pain is exacerbated by walking and standing  Alleviating: The pain is alleviated by sitting    Prior Treatments:  Physical therapy: YES- tried but cost 50$ per week   Injections:NO    Previous Medications: flexeril,  tramadol twice daily   Current Medications: tylenol arthritis  gabapentin 300mg   Previous work-up has included:   XR Results (most recent):  Results from Appointment encounter on 05/02/22    XR SPINE LUMB 2 OR 3 V    Narrative  HISTORY: Lumbar spine pain. Exam: Lumbar spine.     Technique: 3 views lumbar spine were obtained. No prior studies for comparison. FINDINGS: Grade 1 anterolisthesis L4 over L5. 5 lumbar vertebra's. No acute  fracture. . Marked body habitus. Impression  1. No acute compression deformity. Marked body habitus. 2. Grade 1 anterolisthesis L4 over L5. Past Medical History:   Past Medical History:   Diagnosis Date    Diastolic dysfunction     Hypertension     Obesity       Past Surgical History:   Past Surgical History:   Procedure Laterality Date    HX CORONARY STENT PLACEMENT        SocHx:   Social History     Tobacco Use    Smoking status: Former     Packs/day: 1.00     Years: 40.00     Pack years: 40.00     Types: Cigarettes    Smokeless tobacco: Never   Substance Use Topics    Alcohol use: No      FamHx:? Family History   Problem Relation Age of Onset    Hypertension Mother     Stroke Mother     No Known Problems Father        Current Medications:    Current Outpatient Medications   Medication Sig Dispense Refill    gabapentin (NEURONTIN) 300 mg capsule Take 1 Capsule by mouth two (2) times a day for 30 days. Max Daily Amount: 600 mg. 60 Capsule 0    traMADoL (ULTRAM) 50 mg tablet Take 1 Tablet by mouth daily as needed for Pain for up to 10 days. 10 Tablet 0    phentermine (ADIPEX-P) 37.5 mg tablet Take 1 Tablet by mouth every morning. Max Daily Amount: 37.5 mg. 30 Tablet 2    metoprolol tartrate (LOPRESSOR) 50 mg tablet TAKE ONE TABLET BY MOUTH TWICE A DAY FOR MYOCARDIAL REINFACTION PREVENTION 180 Tablet 3    atorvastatin (LIPITOR) 80 mg tablet TAKE ONE TABLET BY MOUTH EVERY EVENING FOR EXCESSIVE FAT IN BLOOD, TREATMENT TO PREVENT A HEART ATTACK 90 Tablet 3    losartan (COZAAR) 100 mg tablet TAKE ONE TABLET BY MOUTH DAILY 90 Tablet 3    aspirin 81 mg chewable tablet Take 1 Tablet by mouth daily. 90 Tablet 1    amLODIPine (NORVASC) 5 mg tablet Take 1 Tablet by mouth daily. 90 Tablet 3    hydroCHLOROthiazide (HYDRODIURIL) 25 mg tablet Take 1 Tablet by mouth daily.  90 Tablet 3    ibuprofen (MOTRIN) 600 mg tablet Take 1 Tablet by mouth every eight (8) hours as needed for Pain. 20 Tablet 0    lidocaine (Lidoderm) 5 % Apply patch to the affected area for 12 hours a day and remove for 12 hours a day. (Patient not taking: No sig reported) 15 Each 0        Medical Decision Making:    Images: The imaging results as well as the actual images of the studies below were reviewed, visualized and interpreted by me. Labs: The results below were reviewed. X-ray lumbar spine 5/2/2022  Grade 1 anterolisthesis L4/5     Assessment:   - right lumbar radiculopathy    Plan:      -Physical therapy -  Discussed PT, cost prohibitive. Added new exercises and stretches. Discussed activities to avoid  -Medications increase  gabapentin 300mg  bid Reviewed potential side effects including but not limited to: drowsiness, dizziness, feeling in a fog, mood changes, fluid retention. - rx for tramadol to have if she has severe pain 10 tablets total   -Diagnostics/Imaging - x-ray lumbar spine reviewed  -Injections -Consider SIMEON- she is not interested in injections at this point   -Lifestyle -encouraged weight loss   -Education - The patient's diagnosis, prognosis and treatment options were discussed today. All questions were answered. F/U -in 3 months or sooner if needed           [address] [facility]    I was in the office while conducting this encounter. Patient at home    Consent:  She and/or her healthcare decision maker is aware that this patient-initiated Telehealth encounter is a billable service, with coverage as determined by her insurance carrier. She is aware that she may receive a bill and has provided verbal consent to proceed: Yes    This virtual visit was conducted telephone encounter only.  -  I affirm this is a Patient Initiated Episode with an Established Patient who has not had a related appointment within my department in the past 7 days or scheduled within the next 24 hours. Note: this encounter is not billable if this call serves to triage the patient into an appointment for the relevant concern. Total Time: minutes: 11-20 minutes.

## 2022-08-25 RX ORDER — GABAPENTIN 300 MG/1
CAPSULE ORAL
Qty: 30 CAPSULE | OUTPATIENT
Start: 2022-08-25

## 2022-09-12 ENCOUNTER — PATIENT OUTREACH (OUTPATIENT)
Dept: CASE MANAGEMENT | Age: 62
End: 2022-09-12

## 2022-09-12 NOTE — PROGRESS NOTES
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 Patient/family has the ability to self-manage at this time. Care management goals have been completed. No further Ambulatory Care Manager follow up scheduled. Goals Addressed                   This Visit's Progress       General     COMPLETED: Follows nutrition recommendations and maintains goal weight (small/ frequent meals)        Patient voicing she is gaining weight since she stopped smoking>> ACM said this could be some of her problems as it was noted Plantar and Spurs on right foot X-Rays  8/8/22  Started Phentermine 7/22/22  Ambulating with son around school track 5 times 2.5 miles 3 days a week before going to work. Not snacking at bedtime since start of medications. COMPLETED: Independent self-management skills        8/8/22  Taking blood pressure daily today 138/72  Exercise by walking track 3 x weekly. COMPLETED: Self-schedules and keeps appointments         Patient requesting pain medication stronger than Tylenol ,Motrin 600 mg,Naprosyn 500 mg.ED x 3 in last 4 months. 6/14/22  IDR meeting patient has been prescribed Tramadol for low back and hip pains. Patient voicing it is helping  8/8/22  Ortho placed on Gabapentin 300 mg every bedtime. Voicing it seems to be helping. Heart Failure     COMPLETED: Understand CHF action plan. Echo 55-60% per Dr Anahi Rubio F/U 9/8/2022  Patient voicing weight gain is from her comfort eating since she stopped smoking. No Edema  6/14/22  She is steady per patient no increase or decrease in weight              Patient has Ambulatory Care Manager's contact information for any further questions, concerns, or needs.   Patients upcoming visits:    Future Appointments   Date Time Provider Stephy Morgan   10/17/2022  9:30 AM AMA LAB AMA BS AMB   10/26/2022 10:00 AM Gordon Gilmore NP AMA BS AMB

## 2022-09-26 DIAGNOSIS — M54.41 ACUTE RIGHT-SIDED LOW BACK PAIN WITH RIGHT-SIDED SCIATICA: ICD-10-CM

## 2022-09-26 RX ORDER — GABAPENTIN 300 MG/1
CAPSULE ORAL
Qty: 60 CAPSULE | Refills: 0 | Status: SHIPPED | OUTPATIENT
Start: 2022-09-26 | End: 2022-10-24

## 2022-10-20 ENCOUNTER — PATIENT MESSAGE (OUTPATIENT)
Dept: FAMILY MEDICINE CLINIC | Age: 62
End: 2022-10-20

## 2022-10-23 DIAGNOSIS — M54.41 ACUTE RIGHT-SIDED LOW BACK PAIN WITH RIGHT-SIDED SCIATICA: ICD-10-CM

## 2022-10-24 RX ORDER — GABAPENTIN 300 MG/1
300 CAPSULE ORAL 2 TIMES DAILY
Qty: 60 CAPSULE | Refills: 2 | Status: SHIPPED | OUTPATIENT
Start: 2022-10-24 | End: 2022-11-23

## 2022-11-07 ENCOUNTER — TELEPHONE (OUTPATIENT)
Dept: MAMMOGRAPHY | Age: 62
End: 2022-11-07

## 2022-11-07 NOTE — TELEPHONE ENCOUNTER
I called  John Alvraez, to assist her  in scheduling a Mammogram . I left a message for this patient to return my call  at [phone].     Russel Coker LPN   Panel Manager

## 2022-11-18 NOTE — PROGRESS NOTES
Per Mikayla Walton instructions patient presents today for Blood Pressure check. Patient seated and resting 15 min with both feet flat on the floor. Blood pressure taking and documented . Reported blood pressure to MIKAYLA Petty
#1:

## 2022-12-09 ENCOUNTER — TELEPHONE (OUTPATIENT)
Dept: FAMILY MEDICINE CLINIC | Age: 62
End: 2022-12-09

## 2022-12-09 NOTE — TELEPHONE ENCOUNTER
Called patient to follow up on request for work note. Would like some cough medicine. States she feel miserable. Has not had the flu shot this year. Note for work written and will be placed up front for  by her granddaughter. Declined the tessalon perles I was going to prescribe her.

## 2022-12-09 NOTE — TELEPHONE ENCOUNTER
Patient called in due to having the flu a week ago patient states she has now going back to work and feels like she still has the flu. Patient would like for nurse Ric Ta to give her a call.

## 2022-12-09 NOTE — TELEPHONE ENCOUNTER
Patient called back in reguards of message stating I am still having flu like symptoms and it has been 1 week . Patient has been informed that flu symptoms can last until 3 weeks . Patient states I can not work like this I tried to go to work but today I had to call out due to lingering symptoms. Patient states I would like a work note starting today due to symptoms . Patient was advised to log on to [employer] and start E-Visit patient states I need help . Ely Martin CMA help patient for an hour trying to figure out patient Login credentials for [address] Patient states I still can not log in . Patient verbalized understanding.

## 2022-12-09 NOTE — LETTER
NOTIFICATION RETURN TO WORK / SCHOOL    12/9/2022 4:01 PM    Ms. Abimbola Hathaway  [address] [phone]      To Whom It May Concern:    Abimbola Hathaway is currently under the care of Radha Awad. She will return to work/school on: 12/15/2022    If there are questions or concerns please have the patient contact our office.         Sincerely,      Ally Patton NP

## 2022-12-12 ENCOUNTER — TELEPHONE (OUTPATIENT)
Dept: FAMILY MEDICINE CLINIC | Age: 62
End: 2022-12-12

## 2022-12-12 NOTE — TELEPHONE ENCOUNTER
1st attempt calling patient in reguards of letter is ready for . Patient did not answer. Left message stating to call the office back .

## 2022-12-12 NOTE — TELEPHONE ENCOUNTER
Attempted to contact patient regarding outstanding Cologuard test.  No answer, advised to call office regarding outstanding health maintenance.

## 2023-03-09 ENCOUNTER — OFFICE VISIT (OUTPATIENT)
Facility: CLINIC | Age: 63
End: 2023-03-09

## 2023-03-09 VITALS
TEMPERATURE: 98 F | HEART RATE: 67 BPM | DIASTOLIC BLOOD PRESSURE: 98 MMHG | RESPIRATION RATE: 20 BRPM | SYSTOLIC BLOOD PRESSURE: 132 MMHG | WEIGHT: 263.2 LBS | OXYGEN SATURATION: 88 % | HEIGHT: 65 IN | BODY MASS INDEX: 43.85 KG/M2

## 2023-03-09 DIAGNOSIS — G47.33 OSA (OBSTRUCTIVE SLEEP APNEA): ICD-10-CM

## 2023-03-09 DIAGNOSIS — I50.32 CHRONIC DIASTOLIC (CONGESTIVE) HEART FAILURE (HCC): ICD-10-CM

## 2023-03-09 DIAGNOSIS — M54.31 SCIATICA, RIGHT SIDE: ICD-10-CM

## 2023-03-09 DIAGNOSIS — I10 ESSENTIAL HYPERTENSION: Primary | ICD-10-CM

## 2023-03-09 DIAGNOSIS — I25.119 ATHEROSCLEROSIS OF NATIVE CORONARY ARTERY OF NATIVE HEART WITH ANGINA PECTORIS (HCC): ICD-10-CM

## 2023-03-09 PROBLEM — E78.2 MIXED HYPERLIPIDEMIA: Status: ACTIVE | Noted: 2020-07-07

## 2023-03-09 PROBLEM — I50.30 DIASTOLIC CHF (HCC): Status: ACTIVE | Noted: 2020-04-15

## 2023-03-09 PROCEDURE — 3079F DIAST BP 80-89 MM HG: CPT | Performed by: NURSE PRACTITIONER

## 2023-03-09 PROCEDURE — 99214 OFFICE O/P EST MOD 30 MIN: CPT | Performed by: NURSE PRACTITIONER

## 2023-03-09 PROCEDURE — 3075F SYST BP GE 130 - 139MM HG: CPT | Performed by: NURSE PRACTITIONER

## 2023-03-09 RX ORDER — ASPIRIN 81 MG/1
81 TABLET, CHEWABLE ORAL DAILY
Qty: 90 TABLET | Refills: 1 | Status: SHIPPED | OUTPATIENT
Start: 2023-03-09

## 2023-03-09 RX ORDER — METOPROLOL SUCCINATE 25 MG/1
TABLET, EXTENDED RELEASE ORAL
COMMUNITY
Start: 2023-02-05 | End: 2023-03-09

## 2023-03-09 RX ORDER — SPIRONOLACTONE 25 MG/1
25 TABLET ORAL DAILY
Qty: 90 TABLET | Refills: 1 | Status: SHIPPED | OUTPATIENT
Start: 2023-03-09

## 2023-03-09 RX ORDER — CLOPIDOGREL BISULFATE 75 MG/1
75 TABLET ORAL DAILY
Qty: 90 TABLET | Refills: 1 | Status: SHIPPED | OUTPATIENT
Start: 2023-03-09

## 2023-03-09 RX ORDER — TRAMADOL HYDROCHLORIDE 50 MG/1
50 TABLET ORAL EVERY 8 HOURS PRN
Qty: 9 TABLET | Refills: 0 | Status: SHIPPED | OUTPATIENT
Start: 2023-03-09 | End: 2023-03-12

## 2023-03-09 RX ORDER — FUROSEMIDE 40 MG/1
40 TABLET ORAL 2 TIMES DAILY
Qty: 180 TABLET | Refills: 1 | Status: SHIPPED | OUTPATIENT
Start: 2023-03-09

## 2023-03-09 RX ORDER — GABAPENTIN 300 MG/1
CAPSULE ORAL
COMMUNITY
Start: 2023-02-22

## 2023-03-09 RX ORDER — TRAMADOL HYDROCHLORIDE 50 MG/1
TABLET ORAL
COMMUNITY
Start: 2023-02-03 | End: 2023-03-09 | Stop reason: SDUPTHER

## 2023-03-09 RX ORDER — ATORVASTATIN CALCIUM 80 MG/1
TABLET, FILM COATED ORAL
Qty: 90 TABLET | Refills: 1 | Status: SHIPPED | OUTPATIENT
Start: 2023-03-09

## 2023-03-09 RX ORDER — SACUBITRIL AND VALSARTAN 24; 26 MG/1; MG/1
TABLET, FILM COATED ORAL
COMMUNITY
Start: 2023-02-03 | End: 2023-03-09 | Stop reason: SDUPTHER

## 2023-03-09 RX ORDER — CLOPIDOGREL BISULFATE 75 MG/1
TABLET ORAL DAILY
COMMUNITY
End: 2023-03-09 | Stop reason: SDUPTHER

## 2023-03-09 RX ORDER — SPIRONOLACTONE 25 MG/1
TABLET ORAL
COMMUNITY
Start: 2023-02-03 | End: 2023-03-09 | Stop reason: SDUPTHER

## 2023-03-09 RX ORDER — FUROSEMIDE 40 MG/1
TABLET ORAL
COMMUNITY
Start: 2023-02-03 | End: 2023-03-09 | Stop reason: SDUPTHER

## 2023-03-09 RX ORDER — SACUBITRIL AND VALSARTAN 24; 26 MG/1; MG/1
1 TABLET, FILM COATED ORAL 2 TIMES DAILY
Qty: 180 TABLET | Refills: 1 | Status: SHIPPED | OUTPATIENT
Start: 2023-03-09

## 2023-03-09 SDOH — ECONOMIC STABILITY: FOOD INSECURITY: WITHIN THE PAST 12 MONTHS, THE FOOD YOU BOUGHT JUST DIDN'T LAST AND YOU DIDN'T HAVE MONEY TO GET MORE.: NEVER TRUE

## 2023-03-09 SDOH — ECONOMIC STABILITY: TRANSPORTATION INSECURITY
IN THE PAST 12 MONTHS, HAS THE LACK OF TRANSPORTATION KEPT YOU FROM MEDICAL APPOINTMENTS OR FROM GETTING MEDICATIONS?: NO

## 2023-03-09 SDOH — ECONOMIC STABILITY: HOUSING INSECURITY
IN THE LAST 12 MONTHS, WAS THERE A TIME WHEN YOU DID NOT HAVE A STEADY PLACE TO SLEEP OR SLEPT IN A SHELTER (INCLUDING NOW)?: NO

## 2023-03-09 SDOH — ECONOMIC STABILITY: FOOD INSECURITY: WITHIN THE PAST 12 MONTHS, YOU WORRIED THAT YOUR FOOD WOULD RUN OUT BEFORE YOU GOT MONEY TO BUY MORE.: NEVER TRUE

## 2023-03-09 SDOH — ECONOMIC STABILITY: TRANSPORTATION INSECURITY
IN THE PAST 12 MONTHS, HAS LACK OF TRANSPORTATION KEPT YOU FROM MEETINGS, WORK, OR FROM GETTING THINGS NEEDED FOR DAILY LIVING?: NO

## 2023-03-09 SDOH — ECONOMIC STABILITY: INCOME INSECURITY: IN THE LAST 12 MONTHS, WAS THERE A TIME WHEN YOU WERE NOT ABLE TO PAY THE MORTGAGE OR RENT ON TIME?: NO

## 2023-03-09 ASSESSMENT — PATIENT HEALTH QUESTIONNAIRE - PHQ9
SUM OF ALL RESPONSES TO PHQ QUESTIONS 1-9: 0
1. LITTLE INTEREST OR PLEASURE IN DOING THINGS: 0
SUM OF ALL RESPONSES TO PHQ QUESTIONS 1-9: 0
SUM OF ALL RESPONSES TO PHQ QUESTIONS 1-9: 0
SUM OF ALL RESPONSES TO PHQ9 QUESTIONS 1 & 2: 0
2. FEELING DOWN, DEPRESSED OR HOPELESS: 0
SUM OF ALL RESPONSES TO PHQ QUESTIONS 1-9: 0

## 2023-03-09 ASSESSMENT — SOCIAL DETERMINANTS OF HEALTH (SDOH): HOW HARD IS IT FOR YOU TO PAY FOR THE VERY BASICS LIKE FOOD, HOUSING, MEDICAL CARE, AND HEATING?: NOT HARD AT ALL

## 2023-03-09 NOTE — PROGRESS NOTES
Room 8    When asked if patient has any concerns she would like to address with MAULIK Srinivasan patient states yes I have been       Did patient bring someone? No     Did the patient have DME equipment? No     Did you take your medication today? Yes       1. \"Have you been to the ER, urgent care clinic since your last visit? Hospitalized since your last visit? \" Patient states I went to [facility] on 01/16/23 I stayed in there for 2 days an I do not remember how I got there and I was told I had fluid in my lungs and feeling very fatigued . 2. \"Have you seen or consulted any other health care providers outside of the [address] [place] [address] since your last visit? \" No     3. For patients aged 39-70: Has the patient had a colonoscopy / FIT/ Cologuard? No Patient states I did the Cologuard in February before 02/14/23      If the patient is female:    4. For patients aged 41-77: Has the patient had a mammogram within the past 2 years? No Patient refused       5. For patients aged 21-65: Has the patient had a pap smear? {Cancer Care Gap present? No Patient states I will schedule an appointment for pap. PHQ-9  3/9/2023   Little interest or pleasure in doing things 0   Little interest or pleasure in doing things -   Feeling down, depressed, or hopeless 0   PHQ-2 Score 0   Total Score PHQ 2 -   PHQ-9 Total Score 0          Health Maintenance Due   Topic Date Due    Pneumococcal 0-64 years Vaccine (1 - PCV) Never done    HIV screen  Never done    Cervical cancer screen  Never done    Diabetes screen  Never done    Colorectal Cancer Screen  Never done    Breast cancer screen  Never done    Shingles vaccine (1 of 2) Never done    Flu vaccine (1) Never done         Who is the primary learner? Patient    What is the preferred language for health care of the primary learner? ENGLISH    How does the primary learner prefer to learn new concepts?  OTHER (COMMENT) Patient states All   Answered By patient Relationship to Learner SELF

## 2023-03-09 NOTE — PATIENT INSTRUCTIONS
[address]  [address] [facility],[address]  [facility] [address]  Phone: [phone]

## 2023-03-09 NOTE — PROGRESS NOTES
[address] [place] [address]               [phone]      Adela Bacon is a 58 y.o. female and presents with Follow-up Chronic Condition, Hypertension, Cholesterol Problem, and Weight Loss       Assessment/Plan:    1. Essential hypertension  -     metoprolol tartrate (LOPRESSOR) 25 MG tablet; Take 1 tablet by mouth 2 times daily, Disp-90 tablet, R-1Adjust Sig  Blood pressure elevated. Planned to increase lopressor however patient states she does not have entresto. Advised if she has entresto and has been taking, increase lopressor to 25mg bid, however, if she has not been taking entresto, keep lopressor at 12.5mg bid and start entresto. Follow up in one month for blood pressure recheck  Patient verbalized understanding and is in agreement with this plan of care  2. Chronic diastolic (congestive) heart failure (HCC)  -     ENTRESTO 24-26 MG per tablet; Take 1 tablet by mouth 2 times daily, Disp-180 tablet, R-1, DAWNormal  -     furosemide (LASIX) 40 MG tablet; Take 1 tablet by mouth 2 times daily, Disp-180 tablet, R-1Normal  -     spironolactone (ALDACTONE) 25 MG tablet; Take 1 tablet by mouth daily, Disp-90 tablet, R-1Normal  -     metoprolol tartrate (LOPRESSOR) 25 MG tablet; Take 1 tablet by mouth 2 times daily, Disp-90 tablet, R-1Adjust Sig  Recent hospitalization for CHF and hypoxic respiratory failure. EF 40% diagnosed with HFrEF. Diuresed for at least 6 liters. Reviewed discharge instructions with patient she has follow up with cardiology but not with pulmonary as of yet. Stress importance of follow up. Refills provided  3. Atherosclerosis of native coronary artery of native heart with angina pectoris (HCC)  -     aspirin 81 MG chewable tablet;  Take 1 tablet by mouth daily, Disp-90 tablet, R-1Normal  -     atorvastatin (LIPITOR) 80 MG tablet; TAKE ONE TABLET BY MOUTH EVERY EVENING FOR EXCESSIVE FAT IN BLOOD, TREATMENT TO PREVENT A HEART ATTACK, Disp-90 tablet, R-1Normal  -     clopidogrel (PLAVIX) 75 MG tablet; Take 1 tablet by mouth daily, Disp-90 tablet, R-1Normal    4. KAY (obstructive sleep apnea)  Diagnosed when hospitalized, has bipap, using about 4 days a week. States she forgets sometimes and fall asleep. Stressed importance of consistent usage and her wellbeing. 5. Sciatica, right side  -     traMADol (ULTRAM) 50 MG tablet; Take 1 tablet by mouth every 8 hours as needed for Pain for up to 3 days. Max Daily Amount: 150 mg, Disp-9 tablet, R-0Normal   States the ultram helps her pains, states she uses sparingly, advised I would give her a one time RX. She is to call Dr. Fabrizio Ferrera and schedule follow up. Follow up and disposition:   Return in about 4 weeks (around 4/6/2023) for HTN, 15min, office. Health Maintenance:   Health Maintenance   Topic Date Due    Pneumococcal 0-64 years Vaccine (1 - PCV) Never done    HIV screen  Never done    Cervical cancer screen  Never done    Diabetes screen  Never done    Colorectal Cancer Screen  Never done    Breast cancer screen  Never done    Shingles vaccine (1 of 2) Never done    Flu vaccine (1) Never done    Depression Screen  07/22/2023    Lipids  01/21/2024    DTaP/Tdap/Td vaccine (2 - Td or Tdap) 03/30/2025    COVID-19 Vaccine  Completed    Hepatitis C screen  Completed    Hepatitis A vaccine  Aged Out    Hib vaccine  Aged Out    Meningococcal (ACWY) vaccine  Aged Out        Subjective:    Labs obtained prior to visit?  No  Reviewed with patient? no    Respiratory failure  Hospitalized from 1/16/23 till 2/2/23  CHF and hypoventilation syndrome  Intubated for less than 2 days  Medications optimized  Found to have severe KAY with sleep study in hospital  Discharged with bipap for home use  States she is using the bipap at least 4 nights a week, sometimes falls asleep  States she sleeps better with it on and wakes up less    Sciatic pain  Problem is intermittent  Starts in calf and goes up to her hip  Does not feel like the gabapentin is helping  She is going to call and make a follow up appointment with dr Jeana Fam      Hypertension:  BP (!) 132/98   Pulse 67   Temp 98 °F (36.7 °C) (Temporal)   Resp 20   Ht 5' 5\" (1.651 m)   Wt 263 lb 3.2 oz (119.4 kg)   SpO2 (!) 88%   BMI 43.80 kg/m²    Patient reports taking medications as instructed. Yes   Medication side effects noted no  Headache upon wakening. no   Home BP monitoring: yes, 139/79, 135/80  Do you experience chest pain/pressure or SOB with exertion? no  Maintain a low Sodium diet? Yes  Lab Results   Component Value Date/Time    BUN 21 07/22/2022 10:23 AM    CREATININE 0.79 07/22/2022 10:23 AM    LABGLOM >60.0 01/24/2020 02:35 PM    K 4.1 07/22/2022 10:23 AM     Key Anti-Hypertensive Meds            ENTRESTO 24-26 MG per tablet (Taking)    Sig - Route: Take 1 tablet by mouth 2 times daily - Oral    furosemide (LASIX) 40 MG tablet (Taking)    Sig - Route: Take 1 tablet by mouth 2 times daily - Oral    spironolactone (ALDACTONE) 25 MG tablet (Taking)    Sig - Route: Take 1 tablet by mouth daily - Oral    metoprolol tartrate (LOPRESSOR) 25 MG tablet (Taking)    Sig - Route: Take 1 tablet by mouth 2 times daily - Oral    Class: Adjust Sig             ROS:     Review of Systems  As stated in HPI, otherwise all others negative. The problem list was updated as a part of today's visit. Patient Active Problem List   Diagnosis    Atherosclerosis of native coronary artery of native heart with angina pectoris (HCC)    Diastolic CHF ([facility] [address]    Essential hypertension    BMI 45.0-49.9, adult ([facility] [address] [address]    History of non-ST elevation myocardial infarction (NSTEMI)    Mixed hyperlipidemia    KAY (obstructive sleep apnea)       The PSH, FH were reviewed. SH:  Social History     Tobacco Use    Smoking status: Former     Packs/day: 1.00     Types: Cigarettes    Smokeless tobacco: Never   Substance Use Topics    Alcohol use: No    Drug use:  No Medications/Allergies:  Current Outpatient Medications on File Prior to Visit   Medication Sig Dispense Refill    gabapentin (NEURONTIN) 300 MG capsule        No current facility-administered medications on file prior to visit. No Known Allergies    Objective:  BP (!) 132/98   Pulse 67   Temp 98 °F (36.7 °C) (Temporal)   Resp 20   Ht 5' 5\" (1.651 m)   Wt 263 lb 3.2 oz (119.4 kg)   SpO2 (!) 88%   BMI 43.80 kg/m²  Body mass index is 43.8 kg/m². Physical assessment  Physical Exam  Vitals and nursing note reviewed. Constitutional:       Appearance: Normal appearance. HENT:      Head: Normocephalic and atraumatic. Eyes:      Pupils: Pupils are equal, round, and reactive to light. Cardiovascular:      Rate and Rhythm: Normal rate and regular rhythm. Heart sounds: Normal heart sounds. No murmur heard. No friction rub. No gallop. Pulmonary:      Effort: Pulmonary effort is normal. No respiratory distress. Breath sounds: Normal breath sounds. Neurological:      Mental Status: She is alert and oriented to person, place, and time. Psychiatric:         Mood and Affect: Mood normal.         Behavior: Behavior normal.         Thought Content: Thought content normal.         Judgment: Judgment normal.               I have discussed the diagnosis with the patient and the intended plan as seen in the above orders. The patient has received an After-Visit Summary and questions were answered concerning future plans. An After Visit Summary was printed and given to the patient. All diagnosis have been discussed with the patient and all of the patient's questions have been answered. Alma Goodwin, AGNP-BC  [address] [facility]   [address] [facility] [address]  [address]

## 2023-03-13 ENCOUNTER — TELEPHONE (OUTPATIENT)
Age: 63
End: 2023-03-13

## 2023-03-22 DIAGNOSIS — M54.31 SCIATICA, RIGHT SIDE: ICD-10-CM

## 2023-03-23 RX ORDER — TRAMADOL HYDROCHLORIDE 50 MG/1
50 TABLET ORAL EVERY 6 HOURS PRN
Qty: 20 TABLET | Refills: 0 | OUTPATIENT
Start: 2023-03-23 | End: 2023-03-28

## 2023-03-27 RX ORDER — GABAPENTIN 300 MG/1
CAPSULE ORAL
Qty: 60 CAPSULE | OUTPATIENT
Start: 2023-03-27

## 2023-04-11 RX ORDER — TRAMADOL HYDROCHLORIDE 50 MG/1
TABLET ORAL
Qty: 9 TABLET | OUTPATIENT
Start: 2023-04-11

## 2023-04-13 ENCOUNTER — OFFICE VISIT (OUTPATIENT)
Facility: CLINIC | Age: 63
End: 2023-04-13
Payer: COMMERCIAL

## 2023-04-13 VITALS
HEIGHT: 65 IN | RESPIRATION RATE: 18 BRPM | WEIGHT: 258.6 LBS | HEART RATE: 69 BPM | SYSTOLIC BLOOD PRESSURE: 127 MMHG | BODY MASS INDEX: 43.09 KG/M2 | DIASTOLIC BLOOD PRESSURE: 87 MMHG | TEMPERATURE: 97.8 F | OXYGEN SATURATION: 90 %

## 2023-04-13 DIAGNOSIS — G47.33 OSA (OBSTRUCTIVE SLEEP APNEA): ICD-10-CM

## 2023-04-13 DIAGNOSIS — M54.31 SCIATICA, RIGHT SIDE: ICD-10-CM

## 2023-04-13 DIAGNOSIS — M54.41 CHRONIC RIGHT-SIDED LOW BACK PAIN WITH RIGHT-SIDED SCIATICA: ICD-10-CM

## 2023-04-13 DIAGNOSIS — E78.2 MIXED HYPERLIPIDEMIA: ICD-10-CM

## 2023-04-13 DIAGNOSIS — G89.29 CHRONIC RIGHT-SIDED LOW BACK PAIN WITH RIGHT-SIDED SCIATICA: ICD-10-CM

## 2023-04-13 DIAGNOSIS — I10 ESSENTIAL HYPERTENSION: Primary | ICD-10-CM

## 2023-04-13 PROCEDURE — 3078F DIAST BP <80 MM HG: CPT | Performed by: NURSE PRACTITIONER

## 2023-04-13 PROCEDURE — 99214 OFFICE O/P EST MOD 30 MIN: CPT | Performed by: NURSE PRACTITIONER

## 2023-04-13 PROCEDURE — 3074F SYST BP LT 130 MM HG: CPT | Performed by: NURSE PRACTITIONER

## 2023-04-13 RX ORDER — GABAPENTIN 300 MG/1
300 CAPSULE ORAL 3 TIMES DAILY
Qty: 270 CAPSULE | Refills: 0 | Status: SHIPPED | OUTPATIENT
Start: 2023-04-13 | End: 2023-07-12

## 2023-04-13 RX ORDER — TRAMADOL HYDROCHLORIDE 50 MG/1
50 TABLET ORAL EVERY 6 HOURS PRN
Qty: 12 TABLET | Refills: 0 | Status: SHIPPED | OUTPATIENT
Start: 2023-04-13 | End: 2023-04-16

## 2023-08-09 ENCOUNTER — HOSPITAL ENCOUNTER (OUTPATIENT)
Facility: HOSPITAL | Age: 63
Setting detail: SPECIMEN
Discharge: HOME | End: 2023-08-12
Payer: COMMERCIAL

## 2023-08-09 LAB
ALBUMIN SERPL-MCNC: 3.6 G/DL (ref 3.4–5)
ALBUMIN/GLOB SERPL: 0.9 (ref 0.8–1.7)
ALP SERPL-CCNC: 78 U/L (ref 45–117)
ALT SERPL-CCNC: 24 U/L (ref 13–56)
ANION GAP SERPL CALC-SCNC: 7 MMOL/L (ref 3–18)
AST SERPL-CCNC: 13 U/L (ref 10–38)
BILIRUB SERPL-MCNC: 0.4 MG/DL (ref 0.2–1)
BUN SERPL-MCNC: 20 MG/DL (ref 7–18)
BUN/CREAT SERPL: 25 (ref 12–20)
CALCIUM SERPL-MCNC: 9.4 MG/DL (ref 8.5–10.1)
CHLORIDE SERPL-SCNC: 103 MMOL/L (ref 100–111)
CHOLEST SERPL-MCNC: 132 MG/DL
CO2 SERPL-SCNC: 34 MMOL/L (ref 21–32)
CREAT SERPL-MCNC: 0.8 MG/DL (ref 0.6–1.3)
ERYTHROCYTE [DISTWIDTH] IN BLOOD BY AUTOMATED COUNT: 13.2 % (ref 11.6–14.5)
GLOBULIN SER CALC-MCNC: 3.9 G/DL (ref 2–4)
GLUCOSE SERPL-MCNC: 123 MG/DL (ref 74–99)
HCT VFR BLD AUTO: 45.7 % (ref 35–45)
HDLC SERPL-MCNC: 43 MG/DL (ref 40–60)
HDLC SERPL: 3.1 (ref 0–5)
HGB BLD-MCNC: 14 G/DL (ref 12–16)
LDLC SERPL CALC-MCNC: 75.4 MG/DL (ref 0–100)
LIPID PANEL: NORMAL
MCH RBC QN AUTO: 31 PG (ref 24–34)
MCHC RBC AUTO-ENTMCNC: 30.6 G/DL (ref 31–37)
MCV RBC AUTO: 101.1 FL (ref 78–100)
NRBC # BLD: 0 K/UL (ref 0–0.01)
NRBC BLD-RTO: 0 PER 100 WBC
PLATELET # BLD AUTO: 248 K/UL (ref 135–420)
PMV BLD AUTO: 11.4 FL (ref 9.2–11.8)
POTASSIUM SERPL-SCNC: 4 MMOL/L (ref 3.5–5.5)
PROT SERPL-MCNC: 7.5 G/DL (ref 6.4–8.2)
RBC # BLD AUTO: 4.52 M/UL (ref 4.2–5.3)
SODIUM SERPL-SCNC: 144 MMOL/L (ref 136–145)
TRIGL SERPL-MCNC: 68 MG/DL
VLDLC SERPL CALC-MCNC: 13.6 MG/DL
WBC # BLD AUTO: 5.8 K/UL (ref 4.6–13.2)

## 2023-08-09 PROCEDURE — 36415 COLL VENOUS BLD VENIPUNCTURE: CPT

## 2023-08-09 PROCEDURE — 80061 LIPID PANEL: CPT

## 2023-08-09 PROCEDURE — 85027 COMPLETE CBC AUTOMATED: CPT

## 2023-08-09 PROCEDURE — 80053 COMPREHEN METABOLIC PANEL: CPT

## 2023-08-14 ENCOUNTER — OFFICE VISIT (OUTPATIENT)
Facility: CLINIC | Age: 63
End: 2023-08-14
Payer: COMMERCIAL

## 2023-08-14 VITALS
SYSTOLIC BLOOD PRESSURE: 128 MMHG | OXYGEN SATURATION: 86 % | BODY MASS INDEX: 43.68 KG/M2 | WEIGHT: 262.2 LBS | TEMPERATURE: 97.9 F | HEART RATE: 70 BPM | HEIGHT: 65 IN | DIASTOLIC BLOOD PRESSURE: 88 MMHG | RESPIRATION RATE: 18 BRPM

## 2023-08-14 DIAGNOSIS — M54.31 SCIATICA, RIGHT SIDE: ICD-10-CM

## 2023-08-14 DIAGNOSIS — R79.81 BORDERLINE LOW OXYGEN SATURATION LEVEL: ICD-10-CM

## 2023-08-14 DIAGNOSIS — Z12.39 ENCOUNTER FOR SCREENING BREAST EXAMINATION AND DISCUSSION OF BREAST SELF EXAMINATION: ICD-10-CM

## 2023-08-14 DIAGNOSIS — Z12.4 SCREENING FOR CERVICAL CANCER: ICD-10-CM

## 2023-08-14 DIAGNOSIS — Z12.11 COLON CANCER SCREENING: ICD-10-CM

## 2023-08-14 DIAGNOSIS — E78.2 MIXED HYPERLIPIDEMIA: ICD-10-CM

## 2023-08-14 DIAGNOSIS — Z01.419 WELL WOMAN EXAM WITH ROUTINE GYNECOLOGICAL EXAM: Primary | ICD-10-CM

## 2023-08-14 DIAGNOSIS — I10 ESSENTIAL HYPERTENSION: ICD-10-CM

## 2023-08-14 PROCEDURE — 99396 PREV VISIT EST AGE 40-64: CPT | Performed by: NURSE PRACTITIONER

## 2023-08-14 PROCEDURE — 3078F DIAST BP <80 MM HG: CPT | Performed by: NURSE PRACTITIONER

## 2023-08-14 PROCEDURE — 3074F SYST BP LT 130 MM HG: CPT | Performed by: NURSE PRACTITIONER

## 2023-08-14 RX ORDER — GABAPENTIN 300 MG/1
300 CAPSULE ORAL 3 TIMES DAILY
Qty: 270 CAPSULE | Refills: 1 | Status: SHIPPED | OUTPATIENT
Start: 2023-08-14 | End: 2024-02-10

## 2023-08-14 ASSESSMENT — PATIENT HEALTH QUESTIONNAIRE - PHQ9
SUM OF ALL RESPONSES TO PHQ QUESTIONS 1-9: 0
SUM OF ALL RESPONSES TO PHQ QUESTIONS 1-9: 0
1. LITTLE INTEREST OR PLEASURE IN DOING THINGS: 0
2. FEELING DOWN, DEPRESSED OR HOPELESS: 0
SUM OF ALL RESPONSES TO PHQ9 QUESTIONS 1 & 2: 0
SUM OF ALL RESPONSES TO PHQ QUESTIONS 1-9: 0
SUM OF ALL RESPONSES TO PHQ QUESTIONS 1-9: 0

## 2023-08-14 ASSESSMENT — ENCOUNTER SYMPTOMS
RESPIRATORY NEGATIVE: 1
GASTROINTESTINAL NEGATIVE: 1
EYES NEGATIVE: 1

## 2023-08-18 LAB
CYTOLOGIST CVX/VAG CYTO: NORMAL
CYTOLOGY CVX/VAG DOC CYTO: NORMAL
CYTOLOGY CVX/VAG DOC THIN PREP: NORMAL
DX ICD CODE: NORMAL
HPV GENOTYPE REFLEX: NORMAL
HPV I/H RISK 4 DNA CVX QL PROBE+SIG AMP: NEGATIVE
Lab: NORMAL
OTHER STN SPEC: NORMAL
STAT OF ADQ CVX/VAG CYTO-IMP: NORMAL

## 2023-09-04 DIAGNOSIS — I50.32 CHRONIC DIASTOLIC (CONGESTIVE) HEART FAILURE (HCC): ICD-10-CM

## 2023-09-04 DIAGNOSIS — I25.119 ATHEROSCLEROSIS OF NATIVE CORONARY ARTERY OF NATIVE HEART WITH ANGINA PECTORIS (HCC): ICD-10-CM

## 2023-09-05 RX ORDER — CLOPIDOGREL BISULFATE 75 MG/1
TABLET ORAL
Qty: 90 TABLET | Refills: 1 | Status: SHIPPED | OUTPATIENT
Start: 2023-09-05

## 2023-09-05 RX ORDER — FUROSEMIDE 40 MG/1
TABLET ORAL
Qty: 180 TABLET | Refills: 1 | Status: SHIPPED | OUTPATIENT
Start: 2023-09-05

## 2023-09-05 RX ORDER — SPIRONOLACTONE 25 MG/1
TABLET ORAL
Qty: 90 TABLET | Refills: 1 | Status: SHIPPED | OUTPATIENT
Start: 2023-09-05

## 2023-12-11 ENCOUNTER — OFFICE VISIT (OUTPATIENT)
Facility: CLINIC | Age: 63
End: 2023-12-11

## 2023-12-11 VITALS
SYSTOLIC BLOOD PRESSURE: 111 MMHG | RESPIRATION RATE: 16 BRPM | HEART RATE: 77 BPM | OXYGEN SATURATION: 98 % | BODY MASS INDEX: 44.48 KG/M2 | HEIGHT: 65 IN | WEIGHT: 267 LBS | DIASTOLIC BLOOD PRESSURE: 68 MMHG

## 2023-12-11 DIAGNOSIS — Z12.31 ENCOUNTER FOR SCREENING MAMMOGRAM FOR MALIGNANT NEOPLASM OF BREAST: ICD-10-CM

## 2023-12-11 DIAGNOSIS — I25.119 ATHEROSCLEROSIS OF NATIVE CORONARY ARTERY OF NATIVE HEART WITH ANGINA PECTORIS (HCC): ICD-10-CM

## 2023-12-11 DIAGNOSIS — E78.2 MIXED HYPERLIPIDEMIA: ICD-10-CM

## 2023-12-11 DIAGNOSIS — Z11.4 SCREENING FOR HIV (HUMAN IMMUNODEFICIENCY VIRUS): ICD-10-CM

## 2023-12-11 DIAGNOSIS — I50.32 CHRONIC DIASTOLIC (CONGESTIVE) HEART FAILURE (HCC): ICD-10-CM

## 2023-12-11 DIAGNOSIS — Z13.1 SCREENING FOR DIABETES MELLITUS (DM): ICD-10-CM

## 2023-12-11 DIAGNOSIS — I10 ESSENTIAL HYPERTENSION: Primary | ICD-10-CM

## 2023-12-11 RX ORDER — ATORVASTATIN CALCIUM 80 MG/1
TABLET, FILM COATED ORAL
Qty: 90 TABLET | Refills: 1 | Status: SHIPPED | OUTPATIENT
Start: 2023-12-11

## 2023-12-11 RX ORDER — GABAPENTIN 300 MG/1
300 CAPSULE ORAL 3 TIMES DAILY
Qty: 270 CAPSULE | Refills: 1 | Status: CANCELLED | OUTPATIENT
Start: 2023-12-11 | End: 2024-06-08

## 2023-12-11 RX ORDER — SPIRONOLACTONE 25 MG/1
25 TABLET ORAL DAILY
Qty: 90 TABLET | Refills: 1 | Status: SHIPPED | OUTPATIENT
Start: 2023-12-11

## 2023-12-11 RX ORDER — ASPIRIN 81 MG/1
81 TABLET, CHEWABLE ORAL DAILY
Qty: 90 TABLET | Refills: 1 | Status: SHIPPED | OUTPATIENT
Start: 2023-12-11

## 2023-12-11 RX ORDER — SACUBITRIL AND VALSARTAN 24; 26 MG/1; MG/1
1 TABLET, FILM COATED ORAL 2 TIMES DAILY
Qty: 180 TABLET | Refills: 1 | Status: SHIPPED | OUTPATIENT
Start: 2023-12-11

## 2023-12-11 RX ORDER — FUROSEMIDE 40 MG/1
40 TABLET ORAL 2 TIMES DAILY
Qty: 180 TABLET | Refills: 1 | Status: CANCELLED | OUTPATIENT
Start: 2023-12-11

## 2023-12-11 RX ORDER — FUROSEMIDE 40 MG/1
40 TABLET ORAL 2 TIMES DAILY
Qty: 180 TABLET | Refills: 1 | Status: SHIPPED | OUTPATIENT
Start: 2023-12-11

## 2024-03-17 DIAGNOSIS — I50.32 CHRONIC DIASTOLIC (CONGESTIVE) HEART FAILURE (HCC): ICD-10-CM

## 2024-03-17 DIAGNOSIS — I10 ESSENTIAL HYPERTENSION: ICD-10-CM

## 2024-03-17 DIAGNOSIS — I25.119 ATHEROSCLEROSIS OF NATIVE CORONARY ARTERY OF NATIVE HEART WITH ANGINA PECTORIS (HCC): ICD-10-CM

## 2024-03-18 RX ORDER — CLOPIDOGREL BISULFATE 75 MG/1
75 TABLET ORAL DAILY
Qty: 30 TABLET | OUTPATIENT
Start: 2024-03-18

## 2024-04-15 DIAGNOSIS — I25.119 ATHEROSCLEROSIS OF NATIVE CORONARY ARTERY OF NATIVE HEART WITH ANGINA PECTORIS (HCC): ICD-10-CM

## 2024-04-15 RX ORDER — CLOPIDOGREL BISULFATE 75 MG/1
75 TABLET ORAL DAILY
Qty: 30 TABLET | OUTPATIENT
Start: 2024-04-15

## 2024-04-24 DIAGNOSIS — I25.119 ATHEROSCLEROSIS OF NATIVE CORONARY ARTERY OF NATIVE HEART WITH ANGINA PECTORIS (HCC): ICD-10-CM

## 2024-04-24 RX ORDER — CLOPIDOGREL BISULFATE 75 MG/1
75 TABLET ORAL DAILY
Qty: 30 TABLET | OUTPATIENT
Start: 2024-04-24

## 2024-05-14 DIAGNOSIS — I50.32 CHRONIC DIASTOLIC (CONGESTIVE) HEART FAILURE (HCC): ICD-10-CM

## 2024-05-14 DIAGNOSIS — I10 ESSENTIAL HYPERTENSION: ICD-10-CM

## 2024-06-12 DIAGNOSIS — E78.2 MIXED HYPERLIPIDEMIA: ICD-10-CM

## 2024-06-12 DIAGNOSIS — I25.119 ATHEROSCLEROSIS OF NATIVE CORONARY ARTERY OF NATIVE HEART WITH ANGINA PECTORIS (HCC): ICD-10-CM

## 2024-06-12 RX ORDER — ATORVASTATIN CALCIUM 80 MG/1
TABLET, FILM COATED ORAL
Qty: 90 TABLET | Refills: 1 | OUTPATIENT
Start: 2024-06-12

## 2024-08-20 DIAGNOSIS — I25.119 ATHEROSCLEROSIS OF NATIVE CORONARY ARTERY OF NATIVE HEART WITH ANGINA PECTORIS (HCC): ICD-10-CM

## 2024-08-21 RX ORDER — CLOPIDOGREL BISULFATE 75 MG/1
75 TABLET ORAL DAILY
Qty: 30 TABLET | OUTPATIENT
Start: 2024-08-21

## 2024-08-30 PROBLEM — J96.01 ACUTE HYPOXEMIC RESPIRATORY FAILURE (HCC): Status: ACTIVE | Noted: 2024-08-30

## 2024-09-09 DIAGNOSIS — I50.32 CHRONIC DIASTOLIC (CONGESTIVE) HEART FAILURE (HCC): ICD-10-CM

## 2024-09-09 RX ORDER — FUROSEMIDE 40 MG
40 TABLET ORAL 2 TIMES DAILY
Qty: 60 TABLET | Refills: 0 | Status: SHIPPED | OUTPATIENT
Start: 2024-09-09

## 2024-09-16 DIAGNOSIS — I50.32 CHRONIC DIASTOLIC (CONGESTIVE) HEART FAILURE (HCC): ICD-10-CM

## 2024-09-16 RX ORDER — SPIRONOLACTONE 25 MG/1
25 TABLET ORAL DAILY
Qty: 90 TABLET | Refills: 0 | Status: SHIPPED | OUTPATIENT
Start: 2024-09-16

## 2024-09-23 ENCOUNTER — TELEPHONE (OUTPATIENT)
Facility: CLINIC | Age: 64
End: 2024-09-23

## 2024-09-23 NOTE — TELEPHONE ENCOUNTER
Patient called to resh her appt for 9/23 due to her feeling very bad, she stated she has a fever of 101.

## 2024-10-07 DIAGNOSIS — I10 ESSENTIAL HYPERTENSION: ICD-10-CM

## 2024-10-07 DIAGNOSIS — I50.32 CHRONIC DIASTOLIC (CONGESTIVE) HEART FAILURE (HCC): ICD-10-CM

## 2024-10-08 ENCOUNTER — OFFICE VISIT (OUTPATIENT)
Facility: CLINIC | Age: 64
End: 2024-10-08
Payer: COMMERCIAL

## 2024-10-08 VITALS
TEMPERATURE: 98 F | WEIGHT: 266 LBS | HEART RATE: 67 BPM | DIASTOLIC BLOOD PRESSURE: 76 MMHG | RESPIRATION RATE: 16 BRPM | OXYGEN SATURATION: 90 % | SYSTOLIC BLOOD PRESSURE: 113 MMHG | BODY MASS INDEX: 44.32 KG/M2 | HEIGHT: 65 IN

## 2024-10-08 DIAGNOSIS — I10 ESSENTIAL HYPERTENSION: Primary | ICD-10-CM

## 2024-10-08 DIAGNOSIS — I50.32 CHRONIC DIASTOLIC (CONGESTIVE) HEART FAILURE (HCC): ICD-10-CM

## 2024-10-08 DIAGNOSIS — E78.2 MIXED HYPERLIPIDEMIA: ICD-10-CM

## 2024-10-08 DIAGNOSIS — I25.119 ATHEROSCLEROSIS OF NATIVE CORONARY ARTERY OF NATIVE HEART WITH ANGINA PECTORIS (HCC): ICD-10-CM

## 2024-10-08 PROCEDURE — 3074F SYST BP LT 130 MM HG: CPT | Performed by: NURSE PRACTITIONER

## 2024-10-08 PROCEDURE — 3078F DIAST BP <80 MM HG: CPT | Performed by: NURSE PRACTITIONER

## 2024-10-08 PROCEDURE — 99214 OFFICE O/P EST MOD 30 MIN: CPT | Performed by: NURSE PRACTITIONER

## 2024-10-08 RX ORDER — METOPROLOL TARTRATE 25 MG/1
25 TABLET, FILM COATED ORAL 2 TIMES DAILY
Qty: 60 TABLET | OUTPATIENT
Start: 2024-10-08

## 2024-10-08 RX ORDER — METOPROLOL TARTRATE 25 MG/1
25 TABLET, FILM COATED ORAL 2 TIMES DAILY
Qty: 180 TABLET | Refills: 1 | Status: SHIPPED | OUTPATIENT
Start: 2024-10-08

## 2024-10-08 SDOH — ECONOMIC STABILITY: FOOD INSECURITY: WITHIN THE PAST 12 MONTHS, THE FOOD YOU BOUGHT JUST DIDN'T LAST AND YOU DIDN'T HAVE MONEY TO GET MORE.: NEVER TRUE

## 2024-10-08 SDOH — ECONOMIC STABILITY: FOOD INSECURITY: WITHIN THE PAST 12 MONTHS, YOU WORRIED THAT YOUR FOOD WOULD RUN OUT BEFORE YOU GOT MONEY TO BUY MORE.: NEVER TRUE

## 2024-10-08 SDOH — ECONOMIC STABILITY: INCOME INSECURITY: HOW HARD IS IT FOR YOU TO PAY FOR THE VERY BASICS LIKE FOOD, HOUSING, MEDICAL CARE, AND HEATING?: NOT HARD AT ALL

## 2024-10-08 ASSESSMENT — PATIENT HEALTH QUESTIONNAIRE - PHQ9
1. LITTLE INTEREST OR PLEASURE IN DOING THINGS: NOT AT ALL
SUM OF ALL RESPONSES TO PHQ QUESTIONS 1-9: 0
2. FEELING DOWN, DEPRESSED OR HOPELESS: NOT AT ALL
SUM OF ALL RESPONSES TO PHQ QUESTIONS 1-9: 0
SUM OF ALL RESPONSES TO PHQ9 QUESTIONS 1 & 2: 0

## 2024-10-08 NOTE — PROGRESS NOTES
Sabrina Moreland is a 64 y.o. female (: 1960) presenting to address:    Chief Complaint   Patient presents with    Follow-up     Patient states that she is having pain in her side. She also states that she has not been able to get the Entresto because it cost her 750$ and she wanted to know if it was something else that she could take.       Vitals:    10/08/24 1612   BP: 113/76   Pulse: 67   Resp: 16   Temp: 98 °F (36.7 °C)   SpO2: 90%       Coordination of Care Questionaire:     \"Have you been to the ER, urgent care clinic since your last visit?  Hospitalized since your last visit?\"    Yes    “Have you seen or consulted any other health care providers outside our system since your last visit?”    No     Have you had a mammogram?”   No           “Have you had a colorectal cancer screening such as a colonoscopy/FIT/Cologuard?    No

## 2024-10-08 NOTE — PROGRESS NOTES
[address] [place] [id number]               [phone]      Sabrina Moreland is a 64 y.o. female and presents with Follow-up (Patient states that she is having pain in her side. She also states that she has not been able to get the Entresto because it cost her 750$ and she wanted to know if it was something else that she could take.)       Assessment/Plan:    1. Essential hypertension  -     metoprolol tartrate (LOPRESSOR) 25 MG tablet; Take 1 tablet by mouth 2 times daily, Disp-180 tablet, R-1Normal  -     Comprehensive Metabolic Panel; Future  2. Mixed hyperlipidemia  -     Lipid Panel; Future  3. Atherosclerosis of native coronary artery of native heart with angina pectoris (HCC)  Assessment & Plan:   Monitored by specialist- no acute findings meriting change in the plan  4. Chronic diastolic (congestive) heart failure (HCC)  -     metoprolol tartrate (LOPRESSOR) 25 MG tablet; Take 1 tablet by mouth 2 times daily, Disp-180 tablet, R-1Normal  Blood pressure controlled, continue metoprolol, entresto and spironolactone at same dose  Lipid controled, last LDL was 75    Refills provided  Labs prior to next visit  Patient verbalized understanding and is in agreement with this plan of care        Follow up and disposition:   Return in about 4 months (around 2/8/2025) for Physical, HTN, HLD, 30min, office, w/labsprior.      Subjective:    Labs obtained prior to visit? No  Reviewed with patient? N/A    Hypertension:  /76 (Site: Right Upper Arm, Position: Sitting, Cuff Size: Large Adult)   Pulse 67   Temp 98 °F (36.7 °C) (Temporal)   Resp 16   Ht 1.651 m (5' 5\")   Wt 120.7 kg (266 lb)   SpO2 90%   BMI 44.26 kg/m²    Patient reports taking medications as instructed. Yes, including entresto, but, she can no longer afford entresto as it cost 750 dollars after her current supply runs out   Medication side effects noted no  Headache upon wakening.no   Home BP monitoring: yes,

## 2024-10-17 RX ORDER — FUROSEMIDE 40 MG/1
40 TABLET ORAL 2 TIMES DAILY
Qty: 60 TABLET | Refills: 0 | OUTPATIENT
Start: 2024-10-17

## 2024-10-22 PROBLEM — J96.01 ACUTE HYPOXEMIC RESPIRATORY FAILURE: Status: RESOLVED | Noted: 2024-08-30 | Resolved: 2024-10-22

## 2024-11-04 ENCOUNTER — CLINICAL DOCUMENTATION (OUTPATIENT)
Facility: CLINIC | Age: 64
End: 2024-11-04

## 2024-11-04 NOTE — PROGRESS NOTES
Called patient no answer. Left a mgs for a return call back to the office to make her a f/u appointment so that we can get her medication sent to her pharmacy.

## 2024-11-08 ENCOUNTER — TELEPHONE (OUTPATIENT)
Facility: CLINIC | Age: 64
End: 2024-11-08

## 2024-11-08 DIAGNOSIS — I25.119 ATHEROSCLEROSIS OF NATIVE CORONARY ARTERY OF NATIVE HEART WITH ANGINA PECTORIS (HCC): Primary | ICD-10-CM

## 2024-11-08 RX ORDER — CLOPIDOGREL BISULFATE 75 MG/1
75 TABLET ORAL DAILY
Qty: 90 TABLET | Refills: 1 | Status: SHIPPED | OUTPATIENT
Start: 2024-11-08 | End: 2024-11-09 | Stop reason: SDUPTHER

## 2024-11-09 RX ORDER — CLOPIDOGREL BISULFATE 75 MG/1
75 TABLET ORAL DAILY
Qty: 90 TABLET | Refills: 1 | Status: SHIPPED | OUTPATIENT
Start: 2024-11-09

## 2024-12-18 PROBLEM — J96.01 ACUTE HYPOXIC ON CHRONIC HYPERCAPNIC RESPIRATORY FAILURE: Status: ACTIVE | Noted: 2024-12-18

## 2024-12-18 PROBLEM — J96.12 ACUTE HYPOXIC ON CHRONIC HYPERCAPNIC RESPIRATORY FAILURE: Status: ACTIVE | Noted: 2024-12-18

## 2024-12-23 ENCOUNTER — CARE COORDINATION (OUTPATIENT)
Facility: CLINIC | Age: 64
End: 2024-12-23

## 2024-12-23 DIAGNOSIS — I25.2 HISTORY OF NON-ST ELEVATION MYOCARDIAL INFARCTION (NSTEMI): ICD-10-CM

## 2024-12-23 DIAGNOSIS — I25.119 ATHEROSCLEROSIS OF NATIVE CORONARY ARTERY OF NATIVE HEART WITH ANGINA PECTORIS (HCC): Primary | ICD-10-CM

## 2024-12-23 DIAGNOSIS — E78.2 MIXED HYPERLIPIDEMIA: ICD-10-CM

## 2024-12-23 DIAGNOSIS — I50.32 CHRONIC DIASTOLIC (CONGESTIVE) HEART FAILURE (HCC): ICD-10-CM

## 2024-12-23 DIAGNOSIS — I10 ESSENTIAL HYPERTENSION: ICD-10-CM

## 2024-12-23 DIAGNOSIS — G47.33 OSA (OBSTRUCTIVE SLEEP APNEA): ICD-10-CM

## 2024-12-23 NOTE — CARE COORDINATION
within 14 days of discharge. CTN sent request for PCP appointment to be rescheduled within 7 days from discharge if possible. Patient will schedule appointments with pulmonology and cardiology.  Future Appointments         Provider Specialty Dept Phone    1/2/2025 11:00 AM Shira Fields APRN - CNP Family Medicine [phone]    2/3/2025 9:15 AM AMA LAB [facility] [phone]    2/10/2025 11:30 AM Shira Fields APRN - CNP Family Medicine [phone]            Care Transition Nurse provided contact information.  Plan for follow-up call in 6-10 days based on severity of symptoms and risk factors.  Plan for next call: symptom management-assess for respiratory red flags  self management-confirm if patient was able to obtain a pulse ox  follow-up appointment-confirm scheduling of pulmonology and cardiology appts  Medication cost savings-confirm if patient received e-mail containing Entresto coupon information      Linda Castellanos RN

## 2024-12-25 DIAGNOSIS — I50.32 CHRONIC DIASTOLIC (CONGESTIVE) HEART FAILURE (HCC): ICD-10-CM

## 2024-12-27 RX ORDER — SPIRONOLACTONE 25 MG/1
25 TABLET ORAL DAILY
Qty: 90 TABLET | Refills: 0 | Status: SHIPPED | OUTPATIENT
Start: 2024-12-27

## 2024-12-30 ENCOUNTER — CARE COORDINATION (OUTPATIENT)
Facility: CLINIC | Age: 64
End: 2024-12-30

## 2024-12-30 NOTE — CARE COORDINATION
Care Transitions Note    Follow Up Call     Patient Current Location:  Home: [address]  [facility] [address]    N Care Coordinator contacted the patient by telephone. Verified name and  as identifiers.    Additional needs identified to be addressed with provider   No needs identified                 Method of communication with provider: none.    Care Summary Note:   Spoke with patient.  Patient states she is doing good.  Patient states her O2 states are good.  Patient states she has not received an email from CTN. Patient states she can not get into [facility].  Number given to patient for [employer] for assistance with [employer].  Patient states he has scheduled appointments with pulmonary and cardiology.   Patient advised to speak with cardiology about not taking Entresto for other options. Patient verbalizes understanding    Advance Care Planning:   Does patient have an Advance Directive: reviewed during previous call, see note. .    Medication Review:  No changes since last call.       Assessments:  No changes since last call    Follow Up Appointment:   Reviewed upcoming appointment(s).  Future Appointments         Provider Specialty Dept Phone    2025 11:00 AM Shira Fields APRN - CNP Family Medicine [phone]    2/3/2025 9:15 AM AMA LAB [facility] [phone]    2/10/2025 11:30 AM Shira Fields APRN - CNP Family Medicine [phone]            LPN Care Coordinator provided contact information.  Plan for follow-up call in 6-10 days based on severity of symptoms and risk factors.  Plan for next call: symptom management-assess for respiratory red flags  follow-up appointment-did patient follow up with pcp,  Medication cost savings-confirm if patient received e-mail containing Entresto coupon information      Jenny Peacock LPN

## 2025-01-02 ENCOUNTER — OFFICE VISIT (OUTPATIENT)
Facility: CLINIC | Age: 65
End: 2025-01-02
Payer: COMMERCIAL

## 2025-01-02 VITALS
HEIGHT: 65 IN | DIASTOLIC BLOOD PRESSURE: 87 MMHG | WEIGHT: 273 LBS | SYSTOLIC BLOOD PRESSURE: 134 MMHG | HEART RATE: 65 BPM | OXYGEN SATURATION: 88 % | TEMPERATURE: 98.1 F | BODY MASS INDEX: 45.48 KG/M2 | RESPIRATION RATE: 18 BRPM

## 2025-01-02 DIAGNOSIS — J96.12 ACUTE HYPOXIC ON CHRONIC HYPERCAPNIC RESPIRATORY FAILURE: Primary | ICD-10-CM

## 2025-01-02 DIAGNOSIS — Z09 HOSPITAL DISCHARGE FOLLOW-UP: ICD-10-CM

## 2025-01-02 DIAGNOSIS — J96.01 ACUTE HYPOXIC ON CHRONIC HYPERCAPNIC RESPIRATORY FAILURE: Primary | ICD-10-CM

## 2025-01-02 PROCEDURE — 3075F SYST BP GE 130 - 139MM HG: CPT | Performed by: NURSE PRACTITIONER

## 2025-01-02 PROCEDURE — 3079F DIAST BP 80-89 MM HG: CPT | Performed by: NURSE PRACTITIONER

## 2025-01-02 PROCEDURE — 99214 OFFICE O/P EST MOD 30 MIN: CPT | Performed by: NURSE PRACTITIONER

## 2025-01-02 RX ORDER — ATORVASTATIN CALCIUM 40 MG/1
40 TABLET, FILM COATED ORAL DAILY
COMMUNITY

## 2025-01-02 ASSESSMENT — PATIENT HEALTH QUESTIONNAIRE - PHQ9
SUM OF ALL RESPONSES TO PHQ QUESTIONS 1-9: 0
SUM OF ALL RESPONSES TO PHQ9 QUESTIONS 1 & 2: 0
2. FEELING DOWN, DEPRESSED OR HOPELESS: NOT AT ALL
1. LITTLE INTEREST OR PLEASURE IN DOING THINGS: NOT AT ALL
SUM OF ALL RESPONSES TO PHQ QUESTIONS 1-9: 0

## 2025-01-02 NOTE — PROGRESS NOTES
Room 8     When asked if patient has seen the (referral) patient states . Patient referral order has been re-printed and address has been highlighted for patient.    Sabrina Moreland had concerns including Follow-Up from Hospital. for today's visit .     When asked if patient has any concerns she/he would like to address with MAULIK Fields . MAULIK Fields has been notified  of patient concerns .      Did you take your medication today?       1. \"Have you been to the ER, urgent care clinic since your last visit?  Hospitalized since your last visit?\" Patient states I went to [facility] in 12/24 due to shortness of breathe     2. \"Have you seen or consulted any other health care providers outside of the [facility] since your last visit?\"    3. For patients aged 45-75: Has the patient had a colonoscopy / FIT/ Cologuard? N/A      If the patient is female:    4. For patients aged 40-74: Has the patient had a mammogram within the past 2 years? N/A      5. For patients aged 21-65: Has the patient had a pap smear? {Cancer Care Gap present? N/A            1/2/2025    11:53 AM   PHQ-9    Little interest or pleasure in doing things 0   Feeling down, depressed, or hopeless 0   PHQ-2 Score 0   PHQ-9 Total Score 0          Health Maintenance Due   Topic Date Due    Pneumococcal 0-64 years Vaccine (1 of 2 - PCV) Never done    HIV screen  Never done    Breast cancer screen  Never done    Colorectal Cancer Screen  Never done    Shingles vaccine (1 of 2) Never done    Respiratory Syncytial Virus (RSV) Pregnant or age 60 yrs+ (1 - Risk 60-74 years 1-dose series) Never done    Flu vaccine (1) Never done    Lipids  08/09/2024    COVID-19 Vaccine (6 - 2023-24 season) 09/01/2024             
medications  She says that in the past 2 to 3 days she has been more short of breath  Has not noticed any lower extremity swelling  She has history of chronic congestive heart failure     Hospital course:   Patient was admitted to the intensive care unit on BiPAP.  Patient's mentation continued to improve.  Patient was able to come off of BiPAP.  Pulmonology evaluated and continued on IV diuresis with Lasix.  Steroids antibiotics discontinued.  Patient's primary problem was morbid obesity with severe obesity hypoventilation syndrome.  Patient will benefit from noninvasive ventilation.  Pulmonology has arranged this to be delivered to patient.  Patient was unfortunately unable to afford Entresto.  Patient will continue other GDMT medications and her cardiac medication.  Patient is getting discharged in stable condition           ROS:     Review of Systems  As stated in HPI, otherwise all others negative.       The problem list was updated as a part of today's visit.  Patient Active Problem List   Diagnosis    Atherosclerosis of native coronary artery of native heart with angina pectoris (HCC)    Chronic diastolic (congestive) heart failure (HCC)    Essential hypertension    BMI 45.0-49.9, adult    History of non-ST elevation myocardial infarction (NSTEMI)    Mixed hyperlipidemia    KAY (obstructive sleep apnea)    Acute hypoxic on chronic hypercapnic respiratory failure       The PSH, FH were reviewed.      SH:  Social History     Tobacco Use    Smoking status: Former     Current packs/day: 0.00     Average packs/day: 1 pack/day for 15.0 years (15.0 ttl pk-yrs)     Types: Cigarettes     Start date: 1/15/1984     Quit date: 1/15/1999     Years since quittin.9    Smokeless tobacco: Never   Vaping Use    Vaping status: Never Used   Substance Use Topics    Alcohol use: No    Drug use: No       Medications/Allergies:  Current Outpatient Medications on File Prior to Visit   Medication Sig Dispense Refill

## 2025-01-06 ENCOUNTER — CARE COORDINATION (OUTPATIENT)
Facility: CLINIC | Age: 65
End: 2025-01-06

## 2025-01-06 NOTE — CARE COORDINATION
Care Transitions Note    Follow Up Call     Patient Current Location:  Home: [address]  [facility] [address]    N Care Coordinator contacted the patient by telephone. Verified name and  as identifiers.    Additional needs identified to be addressed with provider   No needs identified                 Method of communication with provider: none.    Care Summary Note:   Spoke with patient briefly.  Patient states she is doing good.  Patient followed up with pcp on 25.  Patient states she has not completed paperwork for PA for Entresto.  Patient advised to speak with cardiology to see if they have any samples.    Advance Care Planning:   Does patient have an Advance Directive: reviewed during previous call, see note. .    Medication Review:  No changes since last call.     Follow Up Appointment:   Reviewed upcoming appointment(s).  Future Appointments         Provider Specialty Dept Phone    2/3/2025 9:15 AM AMA LAB Family Medicine [phone]    2/10/2025 11:30 AM Shira Fields APRN - CNP Family Medicine [phone]            LPN Care Coordinator provided contact information.  Plan for follow-up call in 6-10 days based on severity of symptoms and risk factors.  Plan for next call: symptom management-assess for respiratory red flags  medication management-has patient completed PA for Entrestro-did patient contact cardio about cost and samples      Jenny Peacock LPN

## 2025-01-13 ENCOUNTER — CARE COORDINATION (OUTPATIENT)
Facility: CLINIC | Age: 65
End: 2025-01-13

## 2025-01-13 NOTE — CARE COORDINATION
Care Transitions Note    Follow Up Call     Patient Current Location:  Home: [address]  [facility] [address]    LPN Care Coordinator contacted the patient by telephone. Verified name and  as identifiers.    Additional needs identified to be addressed with provider   No needs identified                 Method of communication with provider: none.    Care Summary Note:   Spoke with patient.  Patient states she is doing okay.  Patient states she is using the O2 and her pulse is good.  Patient states she will see pulmonary and cardiology on 25.  Patient has no questions or concern for writer.    Advance Care Planning:   Does patient have an Advance Directive: reviewed during previous call, see note. .    Medication Review:  No changes since last call.     Assessments:  No changes since last call    Follow Up Appointment:   Reviewed upcoming appointment(s).  Future Appointments         Provider Specialty Dept Phone    2/3/2025 9:15 AM AMA LAB Family Medicine [phone]    2/10/2025 11:30 AM Shira Fields APRN - CNP Family Medicine [phone]            LPN Care Coordinator provided contact information.  Plan for follow-up call in 6-10 days based on severity of symptoms and risk factors.  Plan for next call:  final call      Jenny Peacock LPN

## 2025-01-15 ENCOUNTER — COMMUNITY OUTREACH (OUTPATIENT)
Facility: CLINIC | Age: 65
End: 2025-01-15

## 2025-01-20 ENCOUNTER — CARE COORDINATION (OUTPATIENT)
Facility: CLINIC | Age: 65
End: 2025-01-20

## 2025-01-20 NOTE — CARE COORDINATION
Care Transitions Note    Final Call     Patient Current Location:  [place]    Care Transition Nurse contacted the patient by telephone. Verified name and  as identifiers.    Patient graduated from the Care Transitions program on 25.  Patient/family verbalizes confidence in the ability to self-manage at this time..      Advance Care Planning:   Does patient have an Advance Directive: reviewed during previous call, see note. .    Handoff:   Patient was not referred to the ACM team due to no additional needs identified.       Care Summary Note: Patient confirmed receipt of NIV and reports compliance with wearing NIV nightly. Breathing feels much better. Wearing O2 PRN and it's not needed daily. O2 sats have been 92-93% lately. Her PCP provided paperwork for the Entresto Prescription Assistance Program.    Assessments:  Care Transitions Subsequent and Final Call    Subsequent and Final Calls  Do you have any ongoing symptoms?: No  Do you have any questions related to your medications?: No  Do you currently have any active services?: No  Do you have any needs or concerns that I can assist you with?: No  Identified Barriers: None  Care Transitions Interventions  No Identified Needs   Medication Assistance Program: Completed     Other Interventions:              Upcoming Appointments:    Future Appointments         Provider Specialty Dept Phone    2/3/2025 9:15 AM AMA LAB Family Medicine [phone]    2/10/2025 11:30 AM Shira Fields, APRN - CNP Family Medicine [phone]            Patient has agreed to contact primary care provider and/or specialist for any further questions, concerns, or needs.    Linda Castellanos RN

## 2025-01-31 DIAGNOSIS — I10 ESSENTIAL HYPERTENSION: ICD-10-CM

## 2025-01-31 DIAGNOSIS — I25.119 ATHEROSCLEROSIS OF NATIVE CORONARY ARTERY OF NATIVE HEART WITH ANGINA PECTORIS (HCC): ICD-10-CM

## 2025-01-31 DIAGNOSIS — E78.2 MIXED HYPERLIPIDEMIA: Primary | ICD-10-CM

## 2025-01-31 DIAGNOSIS — I50.32 CHRONIC DIASTOLIC (CONGESTIVE) HEART FAILURE (HCC): ICD-10-CM

## 2025-02-03 ENCOUNTER — HOSPITAL ENCOUNTER (OUTPATIENT)
Facility: HOSPITAL | Age: 65
Setting detail: SPECIMEN
Discharge: HOME | End: 2025-02-06

## 2025-02-03 LAB — LABCORP SPECIMEN COLLECTION: NORMAL

## 2025-02-03 PROCEDURE — 99001 SPECIMEN HANDLING PT-LAB: CPT

## 2025-02-03 RX ORDER — ATORVASTATIN CALCIUM 40 MG/1
40 TABLET, FILM COATED ORAL DAILY
Qty: 90 TABLET | Refills: 1 | Status: SHIPPED | OUTPATIENT
Start: 2025-02-03

## 2025-02-03 RX ORDER — ASPIRIN 81 MG/1
81 TABLET, CHEWABLE ORAL DAILY
Qty: 90 TABLET | Refills: 1 | Status: SHIPPED | OUTPATIENT
Start: 2025-02-03

## 2025-02-03 RX ORDER — CLOPIDOGREL BISULFATE 75 MG/1
75 TABLET ORAL DAILY
Qty: 90 TABLET | Refills: 1 | OUTPATIENT
Start: 2025-02-03

## 2025-02-03 RX ORDER — METOPROLOL TARTRATE 25 MG/1
25 TABLET, FILM COATED ORAL 2 TIMES DAILY
Qty: 180 TABLET | Refills: 1 | Status: SHIPPED | OUTPATIENT
Start: 2025-02-03

## 2025-02-03 RX ORDER — SPIRONOLACTONE 25 MG/1
25 TABLET ORAL DAILY
Qty: 90 TABLET | Refills: 0 | Status: SHIPPED | OUTPATIENT
Start: 2025-02-03

## 2025-02-03 RX ORDER — SACUBITRIL AND VALSARTAN 24; 26 MG/1; MG/1
1 TABLET, FILM COATED ORAL 2 TIMES DAILY
Qty: 60 TABLET | Refills: 1 | OUTPATIENT
Start: 2025-02-03

## 2025-02-04 LAB
ALBUMIN SERPL-MCNC: 4.2 G/DL (ref 3.9–4.9)
ALP SERPL-CCNC: 78 IU/L (ref 44–121)
ALT SERPL-CCNC: 24 IU/L (ref 0–32)
AST SERPL-CCNC: 18 IU/L (ref 0–40)
BILIRUB SERPL-MCNC: 0.5 MG/DL (ref 0–1.2)
BUN SERPL-MCNC: 17 MG/DL (ref 8–27)
BUN/CREAT SERPL: 23 (ref 12–28)
CALCIUM SERPL-MCNC: 9.4 MG/DL (ref 8.7–10.3)
CHLORIDE SERPL-SCNC: 102 MMOL/L (ref 96–106)
CHOLEST SERPL-MCNC: 176 MG/DL (ref 100–199)
CO2 SERPL-SCNC: 28 MMOL/L (ref 20–29)
CREAT SERPL-MCNC: 0.75 MG/DL (ref 0.57–1)
EGFRCR SERPLBLD CKD-EPI 2021: 89 ML/MIN/1.73
GLOBULIN SER CALC-MCNC: 3 G/DL (ref 1.5–4.5)
GLUCOSE SERPL-MCNC: 132 MG/DL (ref 70–99)
HDLC SERPL-MCNC: 41 MG/DL
IMP & REVIEW OF LAB RESULTS: NORMAL
LDLC SERPL CALC-MCNC: 122 MG/DL (ref 0–99)
POTASSIUM SERPL-SCNC: 4.1 MMOL/L (ref 3.5–5.2)
PROT SERPL-MCNC: 7.2 G/DL (ref 6–8.5)
SODIUM SERPL-SCNC: 144 MMOL/L (ref 134–144)
SPECIMEN STATUS REPORT: NORMAL
TRIGL SERPL-MCNC: 70 MG/DL (ref 0–149)
VLDLC SERPL CALC-MCNC: 13 MG/DL (ref 5–40)

## 2025-02-05 ENCOUNTER — TELEPHONE (OUTPATIENT)
Facility: CLINIC | Age: 65
End: 2025-02-05

## 2025-02-05 NOTE — TELEPHONE ENCOUNTER
Called to inform patient that the patient insurance does not want to cover medication Asprin 81 mg . Patient verbalized understanding and states I will buy it over the counter.

## 2025-02-07 DIAGNOSIS — I50.32 CHRONIC DIASTOLIC (CONGESTIVE) HEART FAILURE (HCC): ICD-10-CM

## 2025-02-09 RX ORDER — FUROSEMIDE 40 MG/1
40 TABLET ORAL 2 TIMES DAILY
Qty: 60 TABLET | Refills: 0 | OUTPATIENT
Start: 2025-02-09

## 2025-02-11 ENCOUNTER — TELEPHONE (OUTPATIENT)
Facility: CLINIC | Age: 65
End: 2025-02-11

## 2025-02-11 NOTE — TELEPHONE ENCOUNTER
Patient wants to know why the pharmacy did not receive the Rx refill for her Furosemide 40 mg Oral 2 times Daily?

## 2025-02-18 ENCOUNTER — OFFICE VISIT (OUTPATIENT)
Facility: CLINIC | Age: 65
End: 2025-02-18
Payer: COMMERCIAL

## 2025-02-18 ENCOUNTER — TELEPHONE (OUTPATIENT)
Facility: CLINIC | Age: 65
End: 2025-02-18

## 2025-02-18 VITALS
SYSTOLIC BLOOD PRESSURE: 160 MMHG | BODY MASS INDEX: 46.48 KG/M2 | WEIGHT: 279 LBS | HEIGHT: 65 IN | RESPIRATION RATE: 14 BRPM | HEART RATE: 57 BPM | TEMPERATURE: 98 F | OXYGEN SATURATION: 81 % | DIASTOLIC BLOOD PRESSURE: 98 MMHG

## 2025-02-18 DIAGNOSIS — E78.2 MIXED HYPERLIPIDEMIA: ICD-10-CM

## 2025-02-18 DIAGNOSIS — J44.1 COPD EXACERBATION (HCC): ICD-10-CM

## 2025-02-18 DIAGNOSIS — I50.32 CHRONIC DIASTOLIC (CONGESTIVE) HEART FAILURE (HCC): ICD-10-CM

## 2025-02-18 DIAGNOSIS — E55.9 VITAMIN D DEFICIENCY: ICD-10-CM

## 2025-02-18 DIAGNOSIS — J96.12 ACUTE HYPOXIC ON CHRONIC HYPERCAPNIC RESPIRATORY FAILURE (HCC): Primary | ICD-10-CM

## 2025-02-18 DIAGNOSIS — Z00.00 WELL WOMAN EXAM (NO GYNECOLOGICAL EXAM): Primary | ICD-10-CM

## 2025-02-18 DIAGNOSIS — R73.01 ELEVATED FASTING GLUCOSE: ICD-10-CM

## 2025-02-18 DIAGNOSIS — I10 ESSENTIAL HYPERTENSION: ICD-10-CM

## 2025-02-18 DIAGNOSIS — J96.01 ACUTE HYPOXIC ON CHRONIC HYPERCAPNIC RESPIRATORY FAILURE (HCC): Primary | ICD-10-CM

## 2025-02-18 PROCEDURE — 3079F DIAST BP 80-89 MM HG: CPT | Performed by: NURSE PRACTITIONER

## 2025-02-18 PROCEDURE — 99396 PREV VISIT EST AGE 40-64: CPT | Performed by: NURSE PRACTITIONER

## 2025-02-18 PROCEDURE — 3077F SYST BP >= 140 MM HG: CPT | Performed by: NURSE PRACTITIONER

## 2025-02-18 RX ORDER — SPIRONOLACTONE 25 MG/1
25 TABLET ORAL DAILY
Qty: 90 TABLET | Refills: 1 | Status: SHIPPED | OUTPATIENT
Start: 2025-02-18

## 2025-02-18 RX ORDER — FUROSEMIDE 40 MG/1
40 TABLET ORAL 2 TIMES DAILY
Qty: 180 TABLET | Refills: 1 | Status: SHIPPED | OUTPATIENT
Start: 2025-02-18

## 2025-02-18 SDOH — ECONOMIC STABILITY: FOOD INSECURITY: WITHIN THE PAST 12 MONTHS, YOU WORRIED THAT YOUR FOOD WOULD RUN OUT BEFORE YOU GOT MONEY TO BUY MORE.: NEVER TRUE

## 2025-02-18 SDOH — ECONOMIC STABILITY: FOOD INSECURITY: WITHIN THE PAST 12 MONTHS, THE FOOD YOU BOUGHT JUST DIDN'T LAST AND YOU DIDN'T HAVE MONEY TO GET MORE.: NEVER TRUE

## 2025-02-18 ASSESSMENT — PATIENT HEALTH QUESTIONNAIRE - PHQ9
SUM OF ALL RESPONSES TO PHQ QUESTIONS 1-9: 0
SUM OF ALL RESPONSES TO PHQ QUESTIONS 1-9: 0
2. FEELING DOWN, DEPRESSED OR HOPELESS: NOT AT ALL
1. LITTLE INTEREST OR PLEASURE IN DOING THINGS: NOT AT ALL
SUM OF ALL RESPONSES TO PHQ QUESTIONS 1-9: 0
SUM OF ALL RESPONSES TO PHQ QUESTIONS 1-9: 0
SUM OF ALL RESPONSES TO PHQ9 QUESTIONS 1 & 2: 0

## 2025-02-18 NOTE — TELEPHONE ENCOUNTER
Narcisa from [facility] called to request a prior authorization from you for  Mrs. Moreland to see them at there practice. Mrs. Moreland has acute hypoxic respiratory failure and obstructive sleep apnea. She gave me the NPI: [id number] and Tax ID: [id number]. Thanks    [phone] contact number

## 2025-02-18 NOTE — TELEPHONE ENCOUNTER
Spoke with Narcisa from [facility] for further information as to why patient needs a referral (Prior Authorization). Narcisa states patient had a hospital stay at [facility] due to Hypoxic Respiratory failure and the doctor that saw patient in the hospital requested for patient to see [facility]. Narcisa states due to patient insurance patient will need a referral sent from PCP . Please advice.

## 2025-02-18 NOTE — PROGRESS NOTES
[address] [place] [address]               [phone]      Sabrina Moreland is a 64 y.o. female and presents with Follow-up Chronic Condition (4 month follow up)       Assessment/Plan:    1. Well woman exam (no gynecological exam)  2. Essential hypertension  -     Comprehensive Metabolic Panel; Future  -     CBC; Future  3. Mixed hyperlipidemia  -     Lipid Panel; Future  4. Chronic diastolic (congestive) heart failure (HCC)  Overview:  Managed by Dr. Rahman  Orders:  -     furosemide (LASIX) 40 MG tablet; Take 1 tablet by mouth 2 times daily, Disp-180 tablet, R-1Normal  -     spironolactone (ALDACTONE) 25 MG tablet; Take 1 tablet by mouth daily, Disp-90 tablet, R-1Normal  -     [facility] Rivera [facility], MD, Cardiology, [place] ([address])  5. COPD exacerbation (HCC)  Assessment & Plan:   Monitored by specialist- no acute findings meriting change in the plan  6. Elevated fasting glucose  -     Hemoglobin A1C; Future  7. Vitamin D deficiency  -     Vitamin D 25 Hydroxy; Future  Annual physical completed today   Blood pressure controlled per home readings, continue metoprolol, spironolactone and lasix at same dose  Lipids controlled, continue atorvastatin at same dose  Refills provided  Labs prior to next visit  Patient verbalized understanding and is in agreement with this plan of care       Follow up and disposition:   Return in about 3 months (around 5/18/2025) for HTN, HLD, 15min, office, w/labsprior.      Subjective:    Labs obtained prior to visit? Yes  Reviewed with patient? yes    Hypertension:  BP (!) 160/98   Pulse 57   Temp 98 °F (36.7 °C) (Temporal)   Resp 14   Ht 1.651 m (5' 5\")   Wt 126.6 kg (279 lb)   SpO2 (!) 81%   BMI 46.43 kg/m²    Patient reports taking medications as instructed. Yes   Medication side effects noted no  Headache upon wakening.no   Home BP monitoring: yes, 135/79, 130/59  Do you experience chest pain/pressure or SOB with exertion? no  Maintain a